# Patient Record
Sex: MALE | Race: WHITE | NOT HISPANIC OR LATINO
[De-identification: names, ages, dates, MRNs, and addresses within clinical notes are randomized per-mention and may not be internally consistent; named-entity substitution may affect disease eponyms.]

---

## 2019-03-18 ENCOUNTER — APPOINTMENT (OUTPATIENT)
Dept: VASCULAR SURGERY | Facility: CLINIC | Age: 29
End: 2019-03-18
Payer: MEDICAID

## 2019-03-18 PROCEDURE — 99203 OFFICE O/P NEW LOW 30 MIN: CPT

## 2019-03-18 NOTE — PHYSICAL EXAM
[Respiratory Effort] : normal respiratory effort [No Rash or Lesion] : No rash or lesion [Alert] : alert [Calm] : calm [2+] : right 2+ [JVD] : no jugular venous distention  [de-identified] : Well appearing. NAD [de-identified] : NCAT [de-identified] : FROM

## 2019-03-18 NOTE — HISTORY OF PRESENT ILLNESS
[FreeTextEntry1] : 29 y/o M presents today for initial evaluation of R ankle pain, cramping and occasional numbness. Patient reports that he intermittently experience ankle pressure, cramping, and numbness. He reports that this occurs while at rest, walking, performing exercises. he denies any swelling.  He reports that he fractured his right ankle a few years ago. He also reports sometimes experiencing these symptoms in his left ankle, but to a lesser extent.

## 2019-03-18 NOTE — ADDENDUM
[FreeTextEntry1] : This note was written by Jaki Patel on 03/18/2019  acting as scribe for Dr. Tucker.\par

## 2019-03-18 NOTE — END OF VISIT
[FreeTextEntry3] : All medical record entries made by the Scribe were at my, Dr. Garcia's, discretion and personally dictated by me on 03/18/2019. I have reviewed the chart and agree that the record accurately reflects my personal performance of the history, physical exam, assessment and plan. I have also personally directed, reviewed and agreed to the chart.

## 2020-06-25 ENCOUNTER — EMERGENCY (EMERGENCY)
Facility: HOSPITAL | Age: 30
LOS: 1 days | Discharge: HOME | End: 2020-06-25
Attending: EMERGENCY MEDICINE | Admitting: EMERGENCY MEDICINE
Payer: MEDICAID

## 2020-06-25 VITALS
HEART RATE: 102 BPM | TEMPERATURE: 99 F | WEIGHT: 300.05 LBS | RESPIRATION RATE: 18 BRPM | OXYGEN SATURATION: 97 % | SYSTOLIC BLOOD PRESSURE: 111 MMHG | DIASTOLIC BLOOD PRESSURE: 65 MMHG

## 2020-06-25 DIAGNOSIS — S61.411A LACERATION WITHOUT FOREIGN BODY OF RIGHT HAND, INITIAL ENCOUNTER: ICD-10-CM
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
## 2020-06-25 PROCEDURE — 12001 RPR S/N/AX/GEN/TRNK 2.5CM/<: CPT

## 2020-06-25 PROCEDURE — 99283 EMERGENCY DEPT VISIT LOW MDM: CPT | Mod: 25

## 2020-06-25 PROCEDURE — 99282 EMERGENCY DEPT VISIT SF MDM: CPT | Mod: 25

## 2020-06-25 NOTE — ED PROVIDER NOTE - CLINICAL SUMMARY MEDICAL DECISION MAKING FREE TEXT BOX
30 y/o M presenting to the ED with R thenar eminence after puncture wound while changing strings on a guitar. Approximately 1cm, bleeding. FROM of fingers, radial pulse intact, tetanus UTD. Will suture. 30 y/o M presenting to the ED with R thenar eminence after puncture wound while changing strings on a guitar. Approximately 1cm, bleeding. FROM of fingers, radial pulse intact, tetanus UTD. The wound is repaired. Patient is given acute laceration and wound care management instructions. Advised to have sutures removed in 10-14 days. Discussed with patient, although normal exam, deficit may become apparent later as wound heals. Change dressing as needed. Monitor closely for infection, use bacitracin ointment as needed. Tetanus is up to date.  Followup in ER or with PCP for suture removal. There is any evidence of infection return to ER immediately.

## 2020-06-25 NOTE — ED PROVIDER NOTE - PATIENT PORTAL LINK FT
You can access the FollowMyHealth Patient Portal offered by [facility] by registering at the following website: [email or web address]. By joining [employer]’s FollowMyHealth portal, you will also be able to view your health information using other applications (apps) compatible with our system.

## 2020-06-25 NOTE — ED ADULT TRIAGE NOTE - CHIEF COMPLAINT QUOTE
accidentally cut his right thumb with pocket knife while trying to fix his guitar today.  Unknown tetanus.  Bleeding controlled.

## 2020-06-25 NOTE — ED PROVIDER NOTE - OBJECTIVE STATEMENT
30 y/o M presenting to the ED with laceration to R hand while changing guitar strings, used wrong tool and cut himself in palm. Patient states his tetanus is UTD in the past year. No numbness, tingling, weakness, able to move all fingers. Says he attempted to put crazy glue on top of his cut but was unable to stop the bleeding.

## 2020-06-25 NOTE — ED PROVIDER NOTE - NSFOLLOWUPINSTRUCTIONS_ED_ALL_ED_FT
please place bacitracin on the wound every 12 hours    please keep area dry for 48 hours; please keep as dry as possible    please keep covered and dry    please monitor for infection (redness, worsening pain, drainage, fever)    A laceration is a cut that goes through all of the layers of the skin and into the tissue that is right under the skin. Some lacerations heal on their own. Others need to be closed with skin adhesive strips, skin glue, stitches (sutures), or staples. Proper laceration care minimizes the risk of infection and helps the laceration to heal better.  If non-absorbable stitches or staples have been placed, they must be taken out within the time frame instructed by your healthcare provider.    SEEK IMMEDIATE MEDICAL CARE IF YOU HAVE ANY OF THE FOLLOWING SYMPTOMS: swelling around the wound, worsening pain, drainage from the wound, red streaking going away from your wound, inability to move finger or toe near the laceration, or discoloration of skin near the laceration.

## 2020-06-25 NOTE — ED ADULT NURSE NOTE - CHPI ED NUR SYMPTOMS NEG
no redness/no vomiting/no drainage/no fever/no purulent drainage/no rectal pain/no blood in mucus/no chills/no bleeding at site

## 2020-06-25 NOTE — ED PROVIDER NOTE - PHYSICAL EXAMINATION
General survey: Patient is well developed and well nourished. Patient is lying in stretcher, not diaphoretic and does not appear in acute distress.    No evidence of gross deformity. Full ROM of fingers, flexion, extension, abduction and adduction. Intact thumb opposition. No TTP phalanges, metacarpals, carpals, styloid proccess, or snuffbox tenderness. No evidence of thenar or hypothenar atrophy. Capillary refill <2 sec.    Skin: right hypothenar eminance, 1cm linear laceration. well approximating. Warm dry and intact. No note of any edema, pallor, jaundice, erythema, ecchymosis, or purpura    Psych: Mood and affect appropriate General survey: Patient is well developed and well nourished. Patient is lying in stretcher, not diaphoretic and does not appear in acute distress.    No evidence of gross deformity. Full ROM of fingers, flexion, extension, abduction and adduction. Intact thumb opposition. No TTP phalanges, metacarpals, carpals, styloid proccess, or snuffbox tenderness. No evidence of thenar or hypothenar atrophy. Capillary refill <2 sec.    Skin: right hypothenar eminence, 1cm linear laceration. well approximating. Warm dry and intact. No note of any edema, pallor, jaundice, erythema, ecchymosis, or purpura    Psych: Mood and affect appropriate

## 2020-07-06 ENCOUNTER — EMERGENCY (EMERGENCY)
Facility: HOSPITAL | Age: 30
LOS: 1 days | Discharge: HOME | End: 2020-07-06
Attending: EMERGENCY MEDICINE | Admitting: EMERGENCY MEDICINE
Payer: MEDICAID

## 2020-07-06 VITALS
TEMPERATURE: 98 F | DIASTOLIC BLOOD PRESSURE: 87 MMHG | OXYGEN SATURATION: 99 % | RESPIRATION RATE: 17 BRPM | HEART RATE: 79 BPM | WEIGHT: 300.05 LBS | SYSTOLIC BLOOD PRESSURE: 154 MMHG

## 2020-07-06 PROCEDURE — L9995: CPT

## 2020-07-06 PROCEDURE — 99212 OFFICE O/P EST SF 10 MIN: CPT

## 2020-07-06 NOTE — ED ADULT NURSE NOTE - NSIMPLEMENTINTERV_GEN_ALL_ED
Implemented All Universal Safety Interventions:  [place] to call system. Call bell, personal items and telephone within reach. Instruct patient to call for assistance. Room bathroom lighting operational. Non-slip footwear when patient is off stretcher. Physically safe environment: no spills, clutter or unnecessary equipment. Stretcher in lowest position, wheels locked, appropriate side rails in place.

## 2020-07-06 NOTE — ED PROVIDER NOTE - PATIENT PORTAL LINK FT
You can access the FollowMyHealth Patient Portal offered by [facility] by registering at the following website: [email or web address]. By joining [employer]’s FollowMyHealth portal, you will also be able to view your health information using other applications (apps) compatible with our system.

## 2020-07-06 NOTE — ED PROVIDER NOTE - ATTENDING CONTRIBUTION TO CARE
I discussed the plan of care of the patient directly with the PA and examined the patient while in the Emergency Department. I agree with the HPI and PE as documented by the PA.  Pt is a 28yo m, who presents for suture removal from r hand placed 1 wk ago. No f/c, pus drainage, swelling, pain... Well [id number] wound to r thenar eminence with 2 sutures in place, c/d/i.  Sutures removed without difficulty. Wound care discussed. Pt given return precautions and is understanding of discharge plan of care. I discussed the plan of care of the patient directly with the PA while the patient was in the Emergency Department. I did not perform a face to face diagnostic evaluation on this patient. I have reviewed the ACP note and agree with the history, exam and plan of care.

## 2020-07-06 NOTE — ED PROVIDER NOTE - CLINICAL SUMMARY MEDICAL DECISION MAKING FREE TEXT BOX
29 M healthy presents for suture removal of wound repaired to R hand one week prior.  1cm lac CDI healed to R thenar eminence w/ 2 sutures intact.  sutures removed without complication.  educated on continued wound care and d/c in stable condition.

## 2020-07-06 NOTE — ED PROVIDER NOTE - PHYSICAL EXAMINATION
Vitals reviewed  Gen: well appearing, nad, speaking in full sentences  Skin: wwp, 1cm healed lac to R thenar eminence w/ two sutures intact- no extending redness/swelling, no discharge or bleeding  HEENT: ncat, eomi, mmm  CV: rrr, no audible m/r/g  Resp: unlabored breathing   Ext: FROM throughout, no peripheral edema  Neuro: alert/oriented, no focal deficits, steady gait

## 2020-07-06 NOTE — ED PROVIDER NOTE - NSFOLLOWUPINSTRUCTIONS_ED_ALL_ED_FT
Suture Removal, Care After  This sheet gives you information about how to care for yourself after your procedure. Your health care provider may also give you more specific instructions. If you have problems or questions, contact your health care provider.  What can I expect after the procedure?  After your stitches (sutures) are removed, it is common to have:  Some discomfort and swelling in the area.Slight redness in the area.Follow these instructions at home:  If you have a bandage:     Wash your hands with soap and water before you change your bandage (dressing). If soap and water are not available, use hand .Change your dressing as told by your health care provider. If your dressing becomes wet or dirty, or develops a bad smell, change it as soon as possible.If your dressing sticks to your skin, soak it in warm water to loosen it.Wound care        Check your wound every day for signs of infection. Check for:  More redness, swelling, or pain.Fluid or blood.Warmth.Pus or a bad smell.Wash your hands with soap and water before and after touching your wound.Apply cream or ointment only as directed by your health care provider. If you are using cream or ointment, wash the area with soap and water 2 times a day to remove all the cream or ointment. Rinse off the soap and pat the area dry with a clean towel.If you have skin glue or adhesive strips on your wound, leave these closures in place. They may need to stay in place for 2 weeks or longer. If adhesive strip edges start to loosen and curl up, you may trim the loose edges. Do not remove adhesive strips completely unless your health care provider tells you to do that.Keep the wound area dry and clean. Do not take baths, swim, or use a hot tub until your health care provider approves.Continue to protect the wound from injury.Do not pick at your wound. Picking can cause an infection.When your wound has completely healed, wear sunscreen over it or cover it with clothing when you are outside. New scars get sunburned easily, which can make scarring worse.General instructions     Take over-the-counter and prescription medicines only as told by your health care provider.Keep all follow-up visits as told by your health care provider. This is important.Contact a health care provider if:  You have redness, swelling, or pain around your wound.You have fluid or blood coming from your wound.Your wound feels warm to the touch.You have pus or a bad smell coming from your wound.Your wound opens up.Get help right away if:  You have a fever.You have redness that is spreading from your wound.Summary  After your sutures are removed, it is common to have some discomfort and swelling in the area.Wash your hands with soap and water before you change your bandage (dressing).Keep the wound area dry and clean. Do not take baths, swim, or use a hot tub until your health care provider approves.This information is not intended to replace advice given to you by your health care provider. Make sure you discuss any questions you have with your health care provider.

## 2020-07-09 DIAGNOSIS — S61.411D LACERATION WITHOUT FOREIGN BODY OF RIGHT HAND, SUBSEQUENT ENCOUNTER: ICD-10-CM

## 2020-07-09 DIAGNOSIS — Z48.02 ENCOUNTER FOR REMOVAL OF SUTURES: ICD-10-CM
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
## 2021-02-02 ENCOUNTER — TRANSCRIPTION ENCOUNTER (OUTPATIENT)
Age: 31
End: 2021-02-02

## 2021-02-05 ENCOUNTER — TRANSCRIPTION ENCOUNTER (OUTPATIENT)
Age: 31
End: 2021-02-05

## 2021-02-25 ENCOUNTER — TRANSCRIPTION ENCOUNTER (OUTPATIENT)
Age: 31
End: 2021-02-25

## 2021-03-01 ENCOUNTER — APPOINTMENT (OUTPATIENT)
Dept: OTOLARYNGOLOGY | Facility: CLINIC | Age: 31
End: 2021-03-01
Payer: MEDICAID

## 2021-03-01 VITALS
TEMPERATURE: 98.1 F | HEIGHT: 76 IN | WEIGHT: 295 LBS | HEART RATE: 94 BPM | BODY MASS INDEX: 35.92 KG/M2 | OXYGEN SATURATION: 95 % | SYSTOLIC BLOOD PRESSURE: 156 MMHG | DIASTOLIC BLOOD PRESSURE: 91 MMHG

## 2021-03-01 DIAGNOSIS — H93.13 TINNITUS, BILATERAL: ICD-10-CM

## 2021-03-01 PROCEDURE — 92504 EAR MICROSCOPY EXAMINATION: CPT

## 2021-03-01 PROCEDURE — 99072 ADDL SUPL MATRL&STAF TM PHE: CPT

## 2021-03-01 PROCEDURE — 99203 OFFICE O/P NEW LOW 30 MIN: CPT | Mod: 25

## 2021-03-01 PROCEDURE — 92557 COMPREHENSIVE HEARING TEST: CPT

## 2021-03-01 PROCEDURE — 92550 TYMPANOMETRY & REFLEX THRESH: CPT

## 2021-03-01 NOTE — PHYSICAL EXAM
[Binocular Microscopic Exam] : Binocular microscopic exam was performed [] : septum deviated to the right [Normal] : no rashes [FreeTextEntry1] : anxious presentation

## 2021-03-01 NOTE — HISTORY OF PRESENT ILLNESS
[de-identified] : Woke up last night w/ a red, inflamed R>L ears and a feeling like "numb but on fire" on the right. Pain level now 0/10. Some dizziness but no spinning; feels very hot at times. No hearing loss but c/o frequent bilat high pitched nonpulsatile tinnitus. Some dizziness but no vertigo. Denies: drainage, frequent loud noise exp/shooting, frequent infections, hx of ear surgery or IV antibiotics/chemo; denies a FHx of hearing loss. Qtip use: yes\par \par Covid-19 screening questions: \par   Sick contacts, recent international travel, shortness of breath, new or productive cough, fevers/chills/night sweats: no\par   COVID-19 testing: neg rapid 3 wks ago\par   Vaccination: no\par

## 2021-03-01 NOTE — ASSESSMENT
[FreeTextEntry1] : Reassured him that his hearing is ok & there are no signs of inflammation. Discussed general tinnitus treatment including masking, amplification when appropriate, notch therapy, helpful smartphone applications and OTC medications. RTC any worsening or recurrence of ear inflammation.

## 2022-01-19 ENCOUNTER — TRANSCRIPTION ENCOUNTER (OUTPATIENT)
Age: 32
End: 2022-01-19

## 2022-03-10 ENCOUNTER — EMERGENCY (EMERGENCY)
Facility: HOSPITAL | Age: 32
LOS: 1 days | Discharge: HOME | End: 2022-03-10
Attending: STUDENT IN AN ORGANIZED HEALTH CARE EDUCATION/TRAINING PROGRAM | Admitting: STUDENT IN AN ORGANIZED HEALTH CARE EDUCATION/TRAINING PROGRAM
Payer: MEDICAID

## 2022-03-10 VITALS — HEART RATE: 96 BPM | RESPIRATION RATE: 18 BRPM | OXYGEN SATURATION: 98 %

## 2022-03-10 VITALS
RESPIRATION RATE: 18 BRPM | SYSTOLIC BLOOD PRESSURE: 144 MMHG | TEMPERATURE: 99 F | OXYGEN SATURATION: 98 % | DIASTOLIC BLOOD PRESSURE: 79 MMHG | HEART RATE: 112 BPM | WEIGHT: 274.92 LBS | HEIGHT: 76 IN

## 2022-03-10 PROCEDURE — 99283 EMERGENCY DEPT VISIT LOW MDM: CPT

## 2022-03-10 PROCEDURE — 99284 EMERGENCY DEPT VISIT MOD MDM: CPT

## 2022-03-10 NOTE — ED PROVIDER NOTE - NSFOLLOWUPCLINICS_GEN_ALL_ED_FT
[facility]  Family Medicine  [address]  [place], [place] [address]  Phone: [phone]  Fax:

## 2022-03-10 NOTE — ED PROVIDER NOTE - PATIENT PORTAL LINK FT
You can access the FollowMyHealth Patient Portal offered by [facility] by registering at the following website: [email or web address]. By joining "[employer]"’s FollowMyHealth portal, you will also be able to view your health information using other applications (apps) compatible with our system.

## 2022-03-10 NOTE — ED PROVIDER NOTE - CLINICAL SUMMARY MEDICAL DECISION MAKING FREE TEXT BOX
Concern for BRBPR likely secondary to hemorrhoids given rubbery bulge on exam and perianal skin tag.  No active rectal bleeding at this time.  Tachycardia likely 2/2 to anxiety, will reassure patient and provide PO hydration.  If persistent tachycardia will r/o acute blood loss anemia, other gross metabolic abnormality and arrythmia as causes for tachycardia.

## 2022-03-10 NOTE — ED PROVIDER NOTE - PHYSICAL EXAMINATION
General: comfortable, resting in ED  HEENT: atraumatic, no eye erythema or discharge  Pulm: no cyanosis, no added work of breathing  Cardiac: extremities warm, intact peripheral pulse  GI: no abdominal distension, abdomen nontender   (with RN Lisa): perianal skin tag, nontender rubbery bulge external anal sphincter, good rectal tone, no blood in rectal vault, no active rectal bleeding, no fissures  Neuro: alert, conversant  Psych: neutral affect, cooperative  Msk: no gross deformity or instability  Skin: no erythema or rash

## 2022-03-10 NOTE — ED PROVIDER NOTE - PROGRESS NOTE DETAILS
HR improved and in normal range after recheck.  Plan to discharge home with return precautions and outpatient followup.

## 2022-03-10 NOTE — ED ADULT NURSE NOTE - OBJECTIVE STATEMENT
Pt presents to ED C/O rectal bleeding when wiping and while cleaning in the shower. Pt referred to ED from [facility] for further evaluation.  Rectal exam performed by MD [facility] with RN chaperone, pt accepting and understanding of intervention. RN continuing to monitor.

## 2022-03-10 NOTE — ED PROVIDER NOTE - OBJECTIVE STATEMENT
31M with no past medical history, now with mild intermittent rectal bleeding for the last 3 days, worse when cleaning area with hand, no gross blood on stool, no diarrhea.

## 2022-03-10 NOTE — ED PROVIDER NOTE - NS ED ROS FT
REVIEW OF SYSTEMS  positive for:   negative for: fever, headache, eye pain, runny nose, sore throat, cough, shortness of breath, chest pain, stomach pain, vomiting, diarrhea, dysuria, myalgias, rash

## 2022-03-11 ENCOUNTER — NON-APPOINTMENT (OUTPATIENT)
Age: 32
End: 2022-03-11

## 2022-03-11 ENCOUNTER — APPOINTMENT (OUTPATIENT)
Dept: INTERNAL MEDICINE | Facility: CLINIC | Age: 32
End: 2022-03-11
Payer: MEDICAID

## 2022-03-11 VITALS
SYSTOLIC BLOOD PRESSURE: 146 MMHG | DIASTOLIC BLOOD PRESSURE: 90 MMHG | OXYGEN SATURATION: 98 % | WEIGHT: 275 LBS | HEART RATE: 93 BPM | RESPIRATION RATE: 14 BRPM | BODY MASS INDEX: 33.47 KG/M2 | TEMPERATURE: 98.7 F

## 2022-03-11 DIAGNOSIS — Z00.01 ENCOUNTER FOR GENERAL ADULT MEDICAL EXAMINATION WITH ABNORMAL FINDINGS: ICD-10-CM

## 2022-03-11 DIAGNOSIS — Z13.220 ENCOUNTER FOR SCREENING FOR LIPOID DISORDERS: ICD-10-CM

## 2022-03-11 PROCEDURE — 99203 OFFICE O/P NEW LOW 30 MIN: CPT | Mod: GC,25

## 2022-03-11 PROCEDURE — 99385 PREV VISIT NEW AGE 18-39: CPT | Mod: 25

## 2022-03-14 ENCOUNTER — APPOINTMENT (OUTPATIENT)
Dept: INTERNAL MEDICINE | Facility: CLINIC | Age: 32
End: 2022-03-14

## 2022-03-14 DIAGNOSIS — F17.200 NICOTINE DEPENDENCE, UNSPECIFIED, UNCOMPLICATED: ICD-10-CM

## 2022-03-14 DIAGNOSIS — K62.5 HEMORRHAGE OF ANUS AND RECTUM: ICD-10-CM

## 2022-03-14 LAB
ALBUMIN SERPL ELPH-MCNC: 5.2 G/DL
ALP BLD-CCNC: 92 U/L
ALT SERPL-CCNC: 30 U/L
ANION GAP SERPL CALC-SCNC: 15 MMOL/L
AST SERPL-CCNC: 18 U/L
BASOPHILS # BLD AUTO: 0.04 K/UL
BASOPHILS NFR BLD AUTO: 0.5 %
BILIRUB SERPL-MCNC: 0.3 MG/DL
BUN SERPL-MCNC: 14 MG/DL
CALCIUM SERPL-MCNC: 9.7 MG/DL
CHLORIDE SERPL-SCNC: 103 MMOL/L
CHOLEST SERPL-MCNC: 207 MG/DL
CO2 SERPL-SCNC: 21 MMOL/L
CREAT SERPL-MCNC: 0.86 MG/DL
EGFR: 118 ML/MIN/1.73M2
EOSINOPHIL # BLD AUTO: 0.03 K/UL
EOSINOPHIL NFR BLD AUTO: 0.4 %
ESTIMATED AVERAGE GLUCOSE: 114 MG/DL
GLUCOSE SERPL-MCNC: 94 MG/DL
HBA1C MFR BLD HPLC: 5.6 %
HCT VFR BLD CALC: 48.6 %
HDLC SERPL-MCNC: 43 MG/DL
HGB BLD-MCNC: 15.9 G/DL
IMM GRANULOCYTES NFR BLD AUTO: 0.1 %
LDLC SERPL CALC-MCNC: 146 MG/DL
LYMPHOCYTES # BLD AUTO: 2.6 K/UL
LYMPHOCYTES NFR BLD AUTO: 34.1 %
MAN DIFF?: NORMAL
MCHC RBC-ENTMCNC: 29.8 PG
MCHC RBC-ENTMCNC: 32.7 GM/DL
MCV RBC AUTO: 91 FL
MONOCYTES # BLD AUTO: 0.48 K/UL
MONOCYTES NFR BLD AUTO: 6.3 %
NEUTROPHILS # BLD AUTO: 4.47 K/UL
NEUTROPHILS NFR BLD AUTO: 58.6 %
NONHDLC SERPL-MCNC: 164 MG/DL
PLATELET # BLD AUTO: 328 K/UL
POTASSIUM SERPL-SCNC: 4.5 MMOL/L
PROT SERPL-MCNC: 7.1 G/DL
RBC # BLD: 5.34 M/UL
RBC # FLD: 13.3 %
SODIUM SERPL-SCNC: 139 MMOL/L
TRIGL SERPL-MCNC: 91 MG/DL
TSH SERPL-ACNC: 1.72 UIU/ML
WBC # FLD AUTO: 7.63 K/UL

## 2022-03-14 NOTE — ED PROCEDURE NOTE - CPROC ED ANATOMIC LOCATION1
Pinkeye: Care Instructions  Overview     Pinkeye is redness and swelling of the eye surface and the conjunctiva (the lining of the eyelid and the covering of the white part of the eye). Pinkeye is also called conjunctivitis. Pinkeye is often caused by infection with bacteria or a virus. Dry air, allergies, smoke, and chemicals are other common causes. Pinkeye often gets better on its own in 7 to 10 days. Antibiotics only help if the pinkeye is caused by bacteria. Pinkeye caused by infection spreads easily. If an allergy or chemical is causing pinkeye, it will not go away unless you can avoid whatever is causing it. Follow-up care is a key part of your treatment and safety. Be sure to make and go to all appointments, and call your doctor if you are having problems. It's also a good idea to know your test results and keep a list of the medicines you take. How can you care for yourself at home? · Wash your hands often. Always wash them before and after you treat pinkeye or touch your eyes or face. · Use moist cotton or a clean, wet cloth to remove crust. Wipe from the inside corner of the eye to the outside. Use a clean part of the cloth for each wipe. · Put cold or warm wet cloths on your eye a few times a day if the eye hurts. · Do not wear contact lenses or eye makeup until the pinkeye is gone. Throw away any eye makeup you were using when you got pinkeye. Clean your contacts and storage case. If you wear disposable contacts, use a new pair when your eye has cleared and it is safe to wear contacts again. · If the doctor gave you antibiotic ointment or eyedrops, use them as directed. Use the medicine for as long as instructed, even if your eye starts looking better soon. Keep the bottle tip clean, and do not let it touch the eye area. · To put in eyedrops or ointment:  ? Tilt your head back, and pull your lower eyelid down with one finger. ? Drop or squirt the medicine inside the lower lid.   ? Close your eye for 30 to 60 seconds to let the drops or ointment move around. ? Do not touch the ointment or dropper tip to your eyelashes or any other surface. · Do not share towels, pillows, or washcloths while you have pinkeye. When should you call for help? Call your doctor now or seek immediate medical care if:    · You have pain in your eye, not just irritation on the surface.     · You have a change in vision or loss of vision.     · You have an increase in discharge from the eye.     · Your eye has not started to improve or begins to get worse within 48 hours after you start using antibiotics.     · Pinkeye lasts longer than 7 days. Watch closely for changes in your health, and be sure to contact your doctor if you have any problems. Where can you learn more? Go to http://www.gray.com/  Enter Y392 in the search box to learn more about \"Pinkeye: Care Instructions. \"  Current as of: July 1, 2021               Content Version: 13.2  © 2006-2022 [employer]. Care instructions adapted under license by [employer] (which disclaims liability or warranty for this information). If you have questions about a medical condition or this instruction, always ask your healthcare professional. [address] any warranty or liability for your use of this information. palmar

## 2022-04-11 ENCOUNTER — APPOINTMENT (OUTPATIENT)
Dept: INTERNAL MEDICINE | Facility: CLINIC | Age: 32
End: 2022-04-11

## 2022-05-06 ENCOUNTER — APPOINTMENT (OUTPATIENT)
Dept: INTERNAL MEDICINE | Facility: CLINIC | Age: 32
End: 2022-05-06
Payer: MEDICAID

## 2022-05-06 VITALS
OXYGEN SATURATION: 99 % | HEART RATE: 105 BPM | TEMPERATURE: 97.8 F | HEIGHT: 76 IN | BODY MASS INDEX: 33.97 KG/M2 | WEIGHT: 279 LBS | SYSTOLIC BLOOD PRESSURE: 141 MMHG | DIASTOLIC BLOOD PRESSURE: 94 MMHG

## 2022-05-06 PROCEDURE — 99214 OFFICE O/P EST MOD 30 MIN: CPT | Mod: GC

## 2022-07-20 ENCOUNTER — APPOINTMENT (OUTPATIENT)
Dept: INTERNAL MEDICINE | Facility: CLINIC | Age: 32
End: 2022-07-20

## 2022-07-20 VITALS
BODY MASS INDEX: 33.47 KG/M2 | OXYGEN SATURATION: 97 % | WEIGHT: 275 LBS | SYSTOLIC BLOOD PRESSURE: 131 MMHG | TEMPERATURE: 99.2 F | DIASTOLIC BLOOD PRESSURE: 96 MMHG | HEART RATE: 98 BPM

## 2022-07-20 DIAGNOSIS — R14.0 ABDOMINAL DISTENSION (GASEOUS): ICD-10-CM

## 2022-07-20 DIAGNOSIS — R10.9 ABDOMINAL DISTENSION (GASEOUS): ICD-10-CM

## 2022-07-20 PROCEDURE — 99213 OFFICE O/P EST LOW 20 MIN: CPT | Mod: GC

## 2022-07-20 RX ORDER — SENNOSIDES 8.6 MG TABLETS 8.6 MG/1
8.6 TABLET ORAL
Qty: 30 | Refills: 0 | Status: DISCONTINUED | COMMUNITY
Start: 2022-03-11 | End: 2022-07-20

## 2022-07-20 RX ORDER — OLANZAPINE 15 MG/1
15 TABLET, FILM COATED ORAL
Refills: 0 | Status: ACTIVE | COMMUNITY
Start: 2022-07-20

## 2022-07-20 RX ORDER — POLYETHYLENE GLYCOL 3350 17 G/17G
17 POWDER, FOR SOLUTION ORAL DAILY
Qty: 30 | Refills: 0 | Status: DISCONTINUED | COMMUNITY
Start: 2022-03-11 | End: 2022-07-20

## 2022-08-24 ENCOUNTER — EMERGENCY (EMERGENCY)
Facility: HOSPITAL | Age: 32
LOS: 1 days | Discharge: HOME | End: 2022-08-24
Attending: EMERGENCY MEDICINE | Admitting: EMERGENCY MEDICINE
Payer: COMMERCIAL

## 2022-08-24 VITALS
WEIGHT: 274.92 LBS | DIASTOLIC BLOOD PRESSURE: 74 MMHG | RESPIRATION RATE: 18 BRPM | SYSTOLIC BLOOD PRESSURE: 135 MMHG | TEMPERATURE: 99 F | HEIGHT: 76 IN | HEART RATE: 122 BPM | OXYGEN SATURATION: 99 %

## 2022-08-24 VITALS — TEMPERATURE: 100 F | HEART RATE: 115 BPM

## 2022-08-24 PROCEDURE — 93005 ELECTROCARDIOGRAM TRACING: CPT

## 2022-08-24 PROCEDURE — 99284 EMERGENCY DEPT VISIT MOD MDM: CPT

## 2022-08-24 PROCEDURE — 99283 EMERGENCY DEPT VISIT LOW MDM: CPT

## 2022-08-24 RX ORDER — AZTREONAM 2 G
1 VIAL (EA) INJECTION
Qty: 14 | Refills: 0
Start: 2022-08-24 | End: 2022-08-30

## 2022-08-24 NOTE — ED PROVIDER NOTE - OBJECTIVE STATEMENT
here with swelling/discomfort of left great toe for past few days. Thinks he may have ingrown nail/ cut nail too short or stubbed it. Says he saw some purulent drainage. No fever, chills.

## 2022-08-24 NOTE — ED PROVIDER NOTE - PATIENT PORTAL LINK FT
You can access the FollowMyHealth Patient Portal offered by [facility] by registering at the following website: [email or web address]. By joining [employer]’s FollowMyHealth portal, you will also be able to view your health information using other applications (apps) compatible with our system.

## 2022-08-24 NOTE — ED PROVIDER NOTE - CARE PROVIDER_API CALL
Raudel Carmen (DPM)  Podiatric Medicine and Surgery  [address]  [place], [place] [address]  Phone: [phone]  Fax: [phone]  Follow Up Time:

## 2022-08-24 NOTE — ED PROVIDER NOTE - CLINICAL SUMMARY MEDICAL DECISION MAKING FREE TEXT BOX
left toenail mild paronychia/ ingrown nail. nail cut very short. plan warm soak/bacitracin/bactrim/ podiatry referral

## 2022-08-24 NOTE — ED PROVIDER NOTE - MUSCULOSKELETAL, MLM
left grat toe mild swelling medial nailbed. no fluctuance. no purulent drainage expressible. toenail cut very short.

## 2022-08-24 NOTE — ED PROVIDER NOTE - NSFOLLOWUPINSTRUCTIONS_ED_ALL_ED_FT
Please see referral to podiatrist.  Call for appointment.  If you have any problems with followup, please call the ED Referral Coordinator at [phone].  Return to the ER if symptoms worsen or other concerns.    Soak toe in warm water several times per day and apply antibiotic ointment after soaking.       Paronychia      Paronychia is an infection of the skin that surrounds a nail. It usually affects the skin around a fingernail, but it may also occur near a toenail. It often causes pain and swelling around the nail. In some cases, a collection of pus (abscess) can form near or under the nail.     This condition may develop suddenly, or it may develop gradually over a longer period. In most cases, paronychia is not serious, and it will clear up with treatment.      What are the causes?    This condition may be caused by bacteria or a fungus, such as yeast. The bacteria or fungus can enter the body through an opening in the skin, such as a cut or a hangnail, and cause an infection in your fingernail or toenail. Other causes may include:  •Recurrent injury to the fingernail or toenail area.      •Irritation of the base and sides of the nail (cuticle).      Injury and irritation can result in inflammation, swelling, and thickened skin around the nail.      What increases the risk?    This condition is more likely to develop in people who:  •Get their hands wet often, such as those who work as dishwashers, , or housekeepers.      •Bite their fingernails or cuticles.      •Have underlying skin conditions.      •Have hangnails or injured fingertips.      •Are exposed to irritants like detergents and other chemicals.      •Have diabetes.        What are the signs or symptoms?    Symptoms of this condition include:  •Redness and swelling of the skin near the nail.      •Tenderness around the nail when you touch the area.      •Pus-filled bumps under the cuticle.      •Fluid or pus under the nail.      •Throbbing pain in the area.        How is this diagnosed?    This condition is diagnosed with a physical exam. In some cases, a sample of pus may be tested to determine what type of bacteria or fungus is causing the condition.      How is this treated?    Treatment depends on the cause and severity of your condition. If your condition is mild, it may clear up on its own in a few days or after soaking in warm water. If needed, treatment may include:  •Antibiotic medicine, if your infection is caused by bacteria.      •Antifungal medicine, if your infection is caused by a fungus.      •A procedure to drain pus from an abscess.      •Anti-inflammatory medicine (corticosteroids).      •Removal of part of an ingrown toenail.      A bandage (dressing) may be placed over the affected area if an abscess or part of a nail has been removed.      Follow these instructions at home:    Wound care     •Keep the affected area clean.      •Soak the affected area in warm water if told to do so by your health care provider. You may be told to do this for 20 minutes, 2–3 times a day.      •Keep the area dry when you are not soaking it.      • Do not try to drain an abscess yourself.    •Follow instructions from your health care provider about how to take care of the affected area. Make sure you:  •Wash your hands with soap and water for at least 20 seconds before and after you change your dressing. If soap and water are not available, use hand .      •Change your dressing as told by your health care provider.      •If you had an abscess drained, check the area every day for signs of infection. Check for:  •Redness, swelling, or pain.      •Fluid or blood.      •Warmth.      •Pus or a bad smell.          Medicines   A prescription pill bottle with an example of a pill.   •Take over-the-counter and prescription medicines only as told by your health care provider.      •If you were prescribed an antibiotic medicine, take it as told by your health care provider. Do not stop taking the antibiotic even if you start to feel better.      General instructions     •Avoid contact with any skin irritants or allergens.      • Do not pick at the affected area.      •Keep all follow-up visits as told. This is important.      Prevention     To prevent this condition from happening again:  •Wear rubber gloves when washing dishes or doing other tasks that require your hands to get wet.      •Wear gloves if your hands might come in contact with  or other chemicals.      •Avoid injuring your nails or fingertips.      •Do not bite your nails or tear hangnails.      •Do not cut your nails very short.      •Do not cut your cuticles.      •Use clean nail clippers or scissors when trimming nails.        Contact a health care provider if:    •Your symptoms get worse or do not improve with treatment.      •You have continued or increased fluid, blood, or pus coming from the affected area.      •Your affected finger, toe, or joint becomes swollen or difficult to move.      •You have a fever or chills.      •There is redness spreading away from the affected area.        Summary    •Paronychia is an infection of the skin that surrounds a nail. It often causes pain and swelling around the nail. In some cases, a collection of pus (abscess) can form near or under the nail.      •This condition may be caused by bacteria or a fungus. These germs can enter the body through an opening in the skin, such as a cut or a hangnail.      •If your condition is mild, it may clear up on its own in a few days. If needed, treatment may include medicine or a procedure to drain pus from an abscess.      •To prevent this condition from happening again, wear gloves if doing tasks that require your hands to get wet or to come in contact with chemicals. Also avoid injuring your nails or fingertips.      This information is not intended to replace advice given to you by your health care provider. Make sure you discuss any questions you have with your health care provider.

## 2022-08-26 DIAGNOSIS — L03.032 CELLULITIS OF LEFT TOE: ICD-10-CM

## 2022-08-26 DIAGNOSIS — M79.89 OTHER SPECIFIED SOFT TISSUE DISORDERS: ICD-10-CM

## 2022-08-26 DIAGNOSIS — M79.675 PAIN IN LEFT TOE(S): ICD-10-CM

## 2022-11-12 ENCOUNTER — EMERGENCY (EMERGENCY)
Facility: HOSPITAL | Age: 32
LOS: 1 days | Discharge: HOME | End: 2022-11-12
Attending: EMERGENCY MEDICINE | Admitting: EMERGENCY MEDICINE
Payer: COMMERCIAL

## 2022-11-12 VITALS
WEIGHT: 179.9 LBS | TEMPERATURE: 99 F | HEART RATE: 97 BPM | SYSTOLIC BLOOD PRESSURE: 141 MMHG | DIASTOLIC BLOOD PRESSURE: 88 MMHG | RESPIRATION RATE: 18 BRPM | OXYGEN SATURATION: 99 %

## 2022-11-12 PROCEDURE — 99283 EMERGENCY DEPT VISIT LOW MDM: CPT | Mod: 25

## 2022-11-12 PROCEDURE — 99283 EMERGENCY DEPT VISIT LOW MDM: CPT

## 2022-11-12 PROCEDURE — 73610 X-RAY EXAM OF ANKLE: CPT | Mod: 26,RT

## 2022-11-12 PROCEDURE — 73610 X-RAY EXAM OF ANKLE: CPT

## 2022-11-12 RX ORDER — IBUPROFEN 200 MG
800 TABLET ORAL ONCE
Refills: 0 | Status: COMPLETED | OUTPATIENT
Start: 2022-11-12 | End: 2022-11-12

## 2022-11-12 RX ADMIN — Medication 800 MILLIGRAM(S): at 20:09

## 2022-11-12 NOTE — ED ADULT TRIAGE NOTE - CHIEF COMPLAINT QUOTE
Patient c/o acute on chronic right ankle pain x today. Patient states he has a hx of right ankle hairline fracture sustained 6 years ago.  Denies recent trauma / falls

## 2022-11-12 NOTE — ED PROVIDER NOTE - NSICDXPASTMEDICALHX_GEN_ALL_CORE_FT
PAST MEDICAL HISTORY:  No pertinent past medical history       Chronic ankle pain     Stress fracture of ankle

## 2022-11-12 NOTE — ED PROVIDER NOTE - PATIENT PORTAL LINK FT
You can access the FollowMyHealth Patient Portal offered by [facility] by registering at the following website: [email or web address]. By joining [employer]’s FollowMyHealth portal, you will also be able to view your health information using other applications (apps) compatible with our system.

## 2022-11-12 NOTE — ED PROVIDER NOTE - PHYSICAL EXAMINATION
VITAL SIGNS: I have reviewed nursing notes and confirm.  CONSTITUTIONAL: Well-developed; well-nourished; in no acute distress.  SKIN: Agree with RN documentation regarding decubitus evaluation. Remainder of skin exam is warm and dry, no acute rash.  HEAD: Normocephalic; atraumatic.  EYES: PERRL, EOM intact; conjunctiva and sclera clear.  ENT: No nasal discharge; airway clear.  NECK: Supple; non tender.  ABD: Normal bowel sounds; soft; non-distended; non-tender; no hepatosplenomegaly.  EXT: Tender to deplantation of ankle, no foot tenderness, full ROM.  Normal ROM. No clubbing, cyanosis or edema.  LYMPH: No acute cervical adenopathy.  NEURO: Alert, oriented. Grossly unremarkable.  PSYCH: Cooperative, appropriate. VITAL SIGNS: I have reviewed nursing notes and confirm.  CONSTITUTIONAL: Well-developed; well-nourished; in no acute distress.  SKIN: Agree with RN documentation regarding decubitus evaluation. Remainder of skin exam is warm and dry, no acute rash.  HEAD: Normocephalic; atraumatic.  EYES: PERRL, EOM intact; conjunctiva and sclera clear.  ENT: No nasal discharge; airway clear.  NECK: Supple; non tender.  ABD: Normal bowel sounds; soft; non-distended; non-tender; no hepatosplenomegaly.  EXT: Tender to deplantation of anterior ankle, no foot tenderness, full ROM.  Normal ROM. No clubbing, cyanosis or edema. + nl sensation and motor and nl distal pulses   LYMPH: No acute cervical adenopathy.  NEURO: Alert, oriented. Grossly unremarkable.  PSYCH: Cooperative, appropriate.

## 2022-11-12 NOTE — ED ADULT NURSE NOTE - OBJECTIVE STATEMENT
Patient c/o acute on chronic right ankle pain x today. Patient states he has a hx of right ankle hairline fracture sustained 6 years ago.  Denies recent trauma / falls. Taken to CT

## 2022-11-12 NOTE — ED PROVIDER NOTE - OBJECTIVE STATEMENT
31 y/o M with a PMHx of stress fracture on ankle as well as spiral fracture in leg (fibula or tibia?) and no surgeries. Pt state fractures are from 5 years ago and has had chronic ankle pain since then and is now presenting to the ED c/o worsening pain to his right ankle. Pt states his right ankle was selling at night. Pt hasn't seen his orthopedist in a long time. Pt denies any numbness.

## 2022-11-12 NOTE — ED PROVIDER NOTE - NSFOLLOWUPINSTRUCTIONS_ED_ALL_ED_FT
There are no apparent new fractures or dislocations on your xray. You may need an MRI of your ankle as an outpatient to further evaluation. Follow up with either orthopedics or with podiatry.     Take Ibuprophen 800 mg ( 4 , 200 mg tablets) up to 4 times a day for one week, ice and elevate your foot / ankle.

## 2022-11-12 NOTE — ED PROVIDER NOTE - CLINICAL SUMMARY MEDICAL DECISION MAKING FREE TEXT BOX
31 y/o M with worsening chronic ankle pain, high suspicion for arthritis discomfort. Will XR for bony abnormalities. Advised ice, ibuprofen, rest, and elevation. Will wrap ankle with ace bandage.

## 2022-11-15 DIAGNOSIS — G89.29 OTHER CHRONIC PAIN: ICD-10-CM

## 2022-11-15 DIAGNOSIS — M25.571 PAIN IN RIGHT ANKLE AND JOINTS OF RIGHT FOOT: ICD-10-CM

## 2022-12-06 NOTE — ED ADULT TRIAGE NOTE - NSWEIGHTCALCTOOLDRUG_GEN_A_CORE
Interventional Radiology Post-Procedure Note    Procedure: Tunneled dialysis catheter (TDC) placement. Left thoracentesis    Attending: Luis Workman MD    Findings: Ultrasound and fluoroscopic placement of a tunneled dialysis catheter.    Small left pleural effusion. 200 mL fluid aspirated    Plan: The tunneled dialysis catheter is ready for use.    used

## 2022-12-18 ENCOUNTER — NON-APPOINTMENT (OUTPATIENT)
Age: 32
End: 2022-12-18

## 2023-01-05 ENCOUNTER — NON-APPOINTMENT (OUTPATIENT)
Age: 33
End: 2023-01-05

## 2023-01-07 PROBLEM — M84.373A: Chronic | Status: ACTIVE | Noted: 2022-11-12

## 2023-01-07 PROBLEM — M25.579 PAIN IN UNSPECIFIED ANKLE AND JOINTS OF UNSPECIFIED FOOT: Chronic | Status: ACTIVE | Noted: 2022-11-12

## 2023-01-17 ENCOUNTER — NON-APPOINTMENT (OUTPATIENT)
Age: 33
End: 2023-01-17

## 2023-01-17 ENCOUNTER — APPOINTMENT (OUTPATIENT)
Dept: ORTHOPEDIC SURGERY | Facility: CLINIC | Age: 33
End: 2023-01-17
Payer: MEDICAID

## 2023-01-17 DIAGNOSIS — M70.61 TROCHANTERIC BURSITIS, RIGHT HIP: ICD-10-CM

## 2023-01-17 DIAGNOSIS — M76.891 OTHER SPECIFIED ENTHESOPATHIES OF RIGHT LOWER LIMB, EXCLUDING FOOT: ICD-10-CM

## 2023-01-17 PROCEDURE — 99203 OFFICE O/P NEW LOW 30 MIN: CPT

## 2023-01-18 NOTE — HISTORY OF PRESENT ILLNESS
[de-identified] : Location:  Right hip pain ( lateral proximal )\par Duration: started 1-2 month ago \par Context: atraumatic  \par Quality:feels like a  tearing in the right hip \par Aggravating factors: over using \par Pain level: 2/10 \par intermittent \par Conservative treatment:  meloxicam, rest \par prior studies: n.a \par \par \par

## 2023-01-18 NOTE — PHYSICAL EXAM
[de-identified] : Right hip\par \par Constitutional: \par The patient is healthy-appearing and in no apparent distress. \par \par Gait:\par The patient ambulates with a normal gait and no limp.\par \par Cardiovascular System: \par There is capillary refill less than 2 seconds. \par \par Skin: \par There is no skin abnormalities.\par \par Lumbar Spine;\par There is no significance malalignment and no tenderness to the paraspinal musculature.\par \par Right hip:\par \par Bony Palpation: \par There is no tenderness of the iliac crest.\par There is no tenderness of the ASIS.\par There is no tenderness of the PSIS.\par There is no tenderness of the SI joint.\par There is tenderness of the greater trochanter. \par \par Soft Tissue Palpation: \par There is no tenderness of the hip adductors.\par There is no tenderness of the hip abductors.\par There is no tenderness of the hamstrings. \par There is no tenderness of the piriformis. \par There is no tenderness of the hip flexors.\par \par Active Range of Motion: \par There is full range of motion at the hip actively and passively. \par There is no groin pain with hip logroll in seated and supine positioning.\par \par Special Tests: \par There is a positive Abby's test.\par There is a negative Clay-Shen test. \par \par Strength: \par There is 5/5 hip flexion, adduction, abduction.  \par \par Psychiatric: \par The patient demonstrates a normal mood and affect and is active and alert. [de-identified] : Given patient's reported history and physical examination, x-ray evaluation ( as listed below ) was ordered and performed to aid in diagnosis and treatment of the patient.\par X-ray right hip.  There is no significant bony / soft tissue abnormality, arthritis, or fracture.

## 2023-01-18 NOTE — ASSESSMENT
[FreeTextEntry1] : Discussed at length with patient exam history and imaging and symptoms consistent with a tight IT band and abductor tendinosis.  Treatment options reviewed and patient this time elects home exercises and observation

## 2023-01-30 NOTE — ED PROVIDER NOTE - NSICDXPASTSURGICALHX_GEN_ALL_CORE_FT
PAST SURGICAL HISTORY:  No significant past surgical history     
Skilled Nursing Facility Notes/Prior Outpatient Labs

## 2023-02-21 ENCOUNTER — APPOINTMENT (OUTPATIENT)
Dept: INTERNAL MEDICINE | Facility: CLINIC | Age: 33
End: 2023-02-21
Payer: MEDICAID

## 2023-02-21 VITALS
SYSTOLIC BLOOD PRESSURE: 144 MMHG | HEIGHT: 64 IN | HEART RATE: 110 BPM | RESPIRATION RATE: 18 BRPM | OXYGEN SATURATION: 98 % | DIASTOLIC BLOOD PRESSURE: 88 MMHG | BODY MASS INDEX: 48.65 KG/M2 | WEIGHT: 285 LBS | TEMPERATURE: 97.8 F

## 2023-02-21 DIAGNOSIS — Z79.899 OTHER LONG TERM (CURRENT) DRUG THERAPY: ICD-10-CM

## 2023-02-21 DIAGNOSIS — Z87.891 PERSONAL HISTORY OF NICOTINE DEPENDENCE: ICD-10-CM

## 2023-02-21 DIAGNOSIS — E78.5 HYPERLIPIDEMIA, UNSPECIFIED: ICD-10-CM

## 2023-02-21 PROCEDURE — G0447 BEHAVIOR COUNSEL OBESITY 15M: CPT

## 2023-02-21 PROCEDURE — 99406 BEHAV CHNG SMOKING 3-10 MIN: CPT

## 2023-02-21 PROCEDURE — 99395 PREV VISIT EST AGE 18-39: CPT | Mod: GC,25

## 2023-02-21 NOTE — ED ADULT NURSE NOTE - NS ED NURSE LEVEL OF CONSCIOUSNESS ORIENTATION
Detail Level: Generalized
Detail Level: Simple
Sunscreen Recommendations: Broad spectrum sunscreen of 30+
Oriented - self; Oriented - place; Oriented - time

## 2023-02-23 LAB
ALBUMIN SERPL ELPH-MCNC: 5 G/DL
ALP BLD-CCNC: 78 U/L
ALT SERPL-CCNC: 26 U/L
ANION GAP SERPL CALC-SCNC: 13 MMOL/L
AST SERPL-CCNC: 22 U/L
BILIRUB SERPL-MCNC: 0.3 MG/DL
BUN SERPL-MCNC: 13 MG/DL
CALCIUM SERPL-MCNC: 9.8 MG/DL
CHLORIDE SERPL-SCNC: 105 MMOL/L
CHOLEST SERPL-MCNC: 176 MG/DL
CO2 SERPL-SCNC: 24 MMOL/L
CREAT SERPL-MCNC: 0.96 MG/DL
EGFR: 107 ML/MIN/1.73M2
GLUCOSE SERPL-MCNC: 86 MG/DL
HDLC SERPL-MCNC: 40 MG/DL
LDLC SERPL CALC-MCNC: 117 MG/DL
NONHDLC SERPL-MCNC: 136 MG/DL
POTASSIUM SERPL-SCNC: 4.6 MMOL/L
PROT SERPL-MCNC: 6.8 G/DL
SODIUM SERPL-SCNC: 142 MMOL/L
TRIGL SERPL-MCNC: 95 MG/DL
TSH SERPL-ACNC: 1.34 UIU/ML

## 2023-05-15 ENCOUNTER — APPOINTMENT (OUTPATIENT)
Dept: INTERNAL MEDICINE | Facility: CLINIC | Age: 33
End: 2023-05-15
Payer: MEDICAID

## 2023-05-15 VITALS
TEMPERATURE: 97.3 F | HEART RATE: 99 BPM | OXYGEN SATURATION: 99 % | SYSTOLIC BLOOD PRESSURE: 155 MMHG | RESPIRATION RATE: 15 BRPM | DIASTOLIC BLOOD PRESSURE: 90 MMHG

## 2023-05-15 DIAGNOSIS — Z00.00 ENCOUNTER FOR GENERAL ADULT MEDICAL EXAMINATION W/OUT ABNORMAL FINDINGS: ICD-10-CM

## 2023-05-15 DIAGNOSIS — Z01.00 ENCOUNTER FOR EXAMINATION OF EYES AND VISION W/OUT ABNORMAL FINDINGS: ICD-10-CM

## 2023-05-15 PROCEDURE — 99214 OFFICE O/P EST MOD 30 MIN: CPT | Mod: GC

## 2023-05-15 NOTE — HISTORY OF PRESENT ILLNESS
[FreeTextEntry1] : needs vision exam for 's license renewal w/ DMV [de-identified] : 31 y/o M with history of mood disorder and HTN presenting for vision exam for 's license renewal. No other active complaints. Pt does not wear glasses or contact lenses and has never had vision issues.

## 2023-05-15 NOTE — PLAN
[FreeTextEntry1] : #HTN\par Pt w/ hx of HTN previously not receptive to treatment. Reported to previously been taking an anti HTN medication but discontinued due to facial flushing (does not remember which medication). Now open to treatment.\par - start HCTZ 12.5mg QD\par - RTC 4 weeks for BMP - renal function assessment\par \par #HCM\par Vision 20/20 (-1) b/l as examined by Snellen chart. Pt able to read small print w/ R+L eye. DMV requirement vision > 20/40. \par - instructed how to fill DMV form for license renewal\par \par RTC 4 weeks for HTN f/u on HCTZ and BMP for renal function assessment

## 2023-05-15 NOTE — REVIEW OF SYSTEMS
[Fever] : no fever [Chills] : no chills [Vision Problems] : no vision problems [Chest Pain] : no chest pain [Palpitations] : no palpitations [Shortness Of Breath] : no shortness of breath [Cough] : no cough [Abdominal Pain] : no abdominal pain [Nausea] : no nausea [Vomiting] : no vomiting [Dysuria] : no dysuria [Skin Rash] : no skin rash [Headache] : no headache [Dizziness] : no dizziness

## 2023-05-15 NOTE — ASSESSMENT
[FreeTextEntry1] : 33 y/o M with history of mood disorder and HTN presenting for vision exam for 's license renewal.

## 2023-05-15 NOTE — END OF VISIT
[] : Resident [FreeTextEntry3] : Here for eye exam for DMV \par 20/25 both eyes \par HTN- noted to be elevated today, receptive to medication today, states he had something before which caused his ears and face to get red, will start HCTZ 12.5mg return to clinic in 4 weeks for repeat BP check and BMP check\par Optho referral for eye check, kiran in setting of HTN, pt amenable

## 2023-05-15 NOTE — PHYSICAL EXAM
[No Acute Distress] : no acute distress [Normal Sclera/Conjunctiva] : normal sclera/conjunctiva [PERRL] : pupils equal round and reactive to light [EOMI] : extraocular movements intact [Normal] : Visual acuity was normal [Snellen] : acuity screening with Snellen chart [No Respiratory Distress] : no respiratory distress  [No Accessory Muscle Use] : no accessory muscle use [Clear to Auscultation] : lungs were clear to auscultation bilaterally [Normal Rate] : normal rate  [Regular Rhythm] : with a regular rhythm [Normal S1, S2] : normal S1 and S2 [No Murmur] : no murmur heard [No Edema] : there was no peripheral edema [Soft] : abdomen soft [Non Tender] : non-tender [Non-distended] : non-distended [No Masses] : no abdominal mass palpated [No HSM] : no HSM [Normal Bowel Sounds] : normal bowel sounds [No Rash] : no rash [No Focal Deficits] : no focal deficits [20/___] : both eyes 20/[unfilled]

## 2023-06-12 ENCOUNTER — RX RENEWAL (OUTPATIENT)
Age: 33
End: 2023-06-12

## 2023-06-12 ENCOUNTER — APPOINTMENT (OUTPATIENT)
Dept: INTERNAL MEDICINE | Facility: CLINIC | Age: 33
End: 2023-06-12
Payer: MEDICAID

## 2023-06-12 VITALS
DIASTOLIC BLOOD PRESSURE: 79 MMHG | WEIGHT: 285 LBS | TEMPERATURE: 97 F | OXYGEN SATURATION: 98 % | HEIGHT: 64 IN | BODY MASS INDEX: 48.65 KG/M2 | HEART RATE: 96 BPM | SYSTOLIC BLOOD PRESSURE: 141 MMHG

## 2023-06-12 DIAGNOSIS — E66.01 MORBID (SEVERE) OBESITY DUE TO EXCESS CALORIES: ICD-10-CM

## 2023-06-12 PROCEDURE — 99214 OFFICE O/P EST MOD 30 MIN: CPT | Mod: GC

## 2023-07-10 ENCOUNTER — APPOINTMENT (OUTPATIENT)
Dept: INTERNAL MEDICINE | Facility: CLINIC | Age: 33
End: 2023-07-10
Payer: MEDICAID

## 2023-07-10 VITALS
BODY MASS INDEX: 34.1 KG/M2 | HEART RATE: 100 BPM | HEIGHT: 76 IN | TEMPERATURE: 97.1 F | WEIGHT: 280 LBS | SYSTOLIC BLOOD PRESSURE: 135 MMHG | DIASTOLIC BLOOD PRESSURE: 83 MMHG | OXYGEN SATURATION: 98 %

## 2023-07-10 VITALS — HEIGHT: 76 IN | BODY MASS INDEX: 34.08 KG/M2

## 2023-07-10 DIAGNOSIS — Z23 ENCOUNTER FOR IMMUNIZATION: ICD-10-CM

## 2023-07-10 PROCEDURE — 90715 TDAP VACCINE 7 YRS/> IM: CPT

## 2023-07-10 PROCEDURE — 90471 IMMUNIZATION ADMIN: CPT

## 2023-07-10 PROCEDURE — 99214 OFFICE O/P EST MOD 30 MIN: CPT | Mod: GC,25

## 2023-07-10 NOTE — ASSESSMENT
[FreeTextEntry1] : 32M w/ PMHx of HTN, mood disorder, daily smoker (1PPD) presents to [facility] for BP f/u. \par \par #Hypertension\par On HCTZ 25mg, recently increased on last visit. Has not checked BP at home as cuff size was inappropriate so never bought device. BP today, 135/85. Patient has been compliant with medications. Likely component of obesity involved.\par - Advised obtaining correct BP cuff\par - C/w HCTZ 25mg as BP has improved, however will need further optimization\par - If home BP still elevated, can consider adding additional agent\par - Weight loss recommendation as below\par \par #Morbid Obesity\par BMI 34. Patient is on Olanzapine for Schizophrenia, which may be exacerbating weight. \par - Start Metformin 500mg qd for weight loss\par - Advised patient to discuss with Psych regarding switching Olanzapine to an alternative agent\par \par #Schizophrenia\par Patient on Olanzapine 15mg, compliant. Follows outpatient at [facility], last visit reportedly 2 weeks ago. Denies auditory or visual hallucinations currently. \par - Follow outpatient\par - As above regarding Olanzapine  \par \par #HCM\par - Tdap today\par RTC in 3 months\par

## 2023-07-10 NOTE — END OF VISIT
[] : Resident [FreeTextEntry3] : \par \par 33 yo m w/ h/o htn, schizophrneia, obesity, tobacco use here for htn\par \par #HTN- bp better but still high- can increase hctz and check labs in a few weeks. should monitor bp at home- cont hctz 25mg as bp better. work on weight loss. \par \par #tobacco use- try to stop- 1 ppd\par \par #obesity- could be related to olanzapine too. work on diet and exercise more. consider metformin for weight loss from antipsychotic posisbly\par \par

## 2023-07-10 NOTE — HISTORY OF PRESENT ILLNESS
[FreeTextEntry1] : BP check [de-identified] : 32M w/ PMHx of HTN, mood disorder, daily smoker (1PPD) presents to [facility] for BP f/u. Patient has been compliant and tolerated medications well. He last took HCTZ 25mg last night. Patient is still actively smoking, smoke earlier today. Patient was not able to obtain a BP cuff due to inappropriate cuff size. Patient has otherwise stayed in his normal state of health. Follows regularly with [facility]. ROS negative.

## 2023-07-10 NOTE — PHYSICAL EXAM
[No Acute Distress] : no acute distress [Well Nourished] : well nourished [Well Developed] : well developed [Well-Appearing] : well-appearing [Normal Sclera/Conjunctiva] : normal sclera/conjunctiva [PERRL] : pupils equal round and reactive to light [EOMI] : extraocular movements intact [Normal Outer Ear/Nose] : the outer ears and nose were normal in appearance [Normal Oropharynx] : the oropharynx was normal [No JVD] : no jugular venous distention [No Lymphadenopathy] : no lymphadenopathy [Supple] : supple [No Respiratory Distress] : no respiratory distress  [Thyroid Normal, No Nodules] : the thyroid was normal and there were no nodules present [No Accessory Muscle Use] : no accessory muscle use [Clear to Auscultation] : lungs were clear to auscultation bilaterally [Normal Rate] : normal rate  [Regular Rhythm] : with a regular rhythm [Normal S1, S2] : normal S1 and S2 [No Murmur] : no murmur heard [No Carotid Bruits] : no carotid bruits [No Abdominal Bruit] : a ~M bruit was not heard ~T in the abdomen [No Varicosities] : no varicosities [Pedal Pulses Present] : the pedal pulses are present [No Edema] : there was no peripheral edema [No Palpable Aorta] : no palpable aorta [No Extremity Clubbing/Cyanosis] : no extremity clubbing/cyanosis [Soft] : abdomen soft [Non Tender] : non-tender [Non-distended] : non-distended [No Masses] : no abdominal mass palpated [No HSM] : no HSM [Normal Bowel Sounds] : normal bowel sounds [Normal Posterior Cervical Nodes] : no posterior cervical lymphadenopathy [Normal Anterior Cervical Nodes] : no anterior cervical lymphadenopathy [No CVA Tenderness] : no CVA  tenderness [No Spinal Tenderness] : no spinal tenderness [No Joint Swelling] : no joint swelling [Grossly Normal Strength/Tone] : grossly normal strength/tone [No Rash] : no rash [Coordination Grossly Intact] : coordination grossly intact [No Focal Deficits] : no focal deficits [Normal Gait] : normal gait [Deep Tendon Reflexes (DTR)] : deep tendon reflexes were 2+ and symmetric [Normal Affect] : the affect was normal [Normal Insight/Judgement] : insight and judgment were intact [de-identified] : +obese body habitus

## 2023-07-10 NOTE — HEALTH RISK ASSESSMENT
[0] : 2) Feeling down, depressed, or hopeless: Not at all (0) [PHQ-2 Negative - No further assessment needed] : PHQ-2 Negative - No further assessment needed [[id number]] : 0

## 2023-07-12 LAB
ALBUMIN SERPL ELPH-MCNC: 4.6 G/DL
ALP BLD-CCNC: 75 U/L
ALT SERPL-CCNC: 17 U/L
ANION GAP SERPL CALC-SCNC: 13 MMOL/L
AST SERPL-CCNC: 17 U/L
BILIRUB SERPL-MCNC: 0.2 MG/DL
BUN SERPL-MCNC: 14 MG/DL
CALCIUM SERPL-MCNC: 9.4 MG/DL
CHLORIDE SERPL-SCNC: 101 MMOL/L
CO2 SERPL-SCNC: 25 MMOL/L
CREAT SERPL-MCNC: 0.91 MG/DL
EGFR: 114 ML/MIN/1.73M2
GLUCOSE SERPL-MCNC: 89 MG/DL
POTASSIUM SERPL-SCNC: 3.3 MMOL/L
PROT SERPL-MCNC: 6.7 G/DL
SODIUM SERPL-SCNC: 139 MMOL/L

## 2023-07-24 ENCOUNTER — NON-APPOINTMENT (OUTPATIENT)
Age: 33
End: 2023-07-24

## 2023-08-01 ENCOUNTER — APPOINTMENT (OUTPATIENT)
Dept: INTERNAL MEDICINE | Facility: CLINIC | Age: 33
End: 2023-08-01
Payer: MEDICAID

## 2023-08-01 VITALS
HEIGHT: 76 IN | DIASTOLIC BLOOD PRESSURE: 87 MMHG | BODY MASS INDEX: 33.49 KG/M2 | SYSTOLIC BLOOD PRESSURE: 125 MMHG | TEMPERATURE: 97.1 F | WEIGHT: 275 LBS | OXYGEN SATURATION: 98 % | HEART RATE: 104 BPM

## 2023-08-01 DIAGNOSIS — E87.6 HYPOKALEMIA: ICD-10-CM

## 2023-08-01 PROCEDURE — 99213 OFFICE O/P EST LOW 20 MIN: CPT | Mod: 25

## 2023-08-01 RX ORDER — HYDROCHLOROTHIAZIDE 25 MG/1
25 TABLET ORAL DAILY
Qty: 30 | Refills: 1 | Status: DISCONTINUED | COMMUNITY
Start: 2023-05-15 | End: 2023-08-01

## 2023-08-01 NOTE — PHYSICAL EXAM
[No Acute Distress] : no acute distress [Well Nourished] : well nourished [Well Developed] : well developed [Well-Appearing] : well-appearing [Normal Sclera/Conjunctiva] : normal sclera/conjunctiva [Normal Oropharynx] : the oropharynx was normal [No Respiratory Distress] : no respiratory distress  [No Accessory Muscle Use] : no accessory muscle use [Clear to Auscultation] : lungs were clear to auscultation bilaterally [Normal Rate] : normal rate  [Regular Rhythm] : with a regular rhythm [Normal S1, S2] : normal S1 and S2 [No Murmur] : no murmur heard [No Edema] : there was no peripheral edema [No Rash] : no rash [Normal Gait] : normal gait [de-identified] : odd affect, jittery

## 2023-08-01 NOTE — HISTORY OF PRESENT ILLNESS
[FreeTextEntry1] : f/u to check potassium and blood pressure [de-identified] : 32 y/o M PMH HTN, schizophrenia here to f/u blood pressure and check potassium. He was last here on 7/10, found to have K 3.3, and switched from HCTZ to triamterene/HCTZ combo. He does not have any complaints today. He still smokes cigarettes and does not have interest in stopping now. He does not drink EtOH or use recreational drugs but drinks a lot of coffee. He does not have a bp cuff but plans on buying a watch that checks BP instead. He has started riding his bike more.

## 2023-08-01 NOTE — HEALTH RISK ASSESSMENT
[0] : 2) Feeling down, depressed, or hopeless: Not at all (0) [PHQ-2 Negative - No further assessment needed] : PHQ-2 Negative - No further assessment needed [[id number]] : 0

## 2023-08-01 NOTE — REVIEW OF SYSTEMS
[Fever] : no fever [Chills] : no chills [Night Sweats] : no night sweats [Vision Problems] : no vision problems [Chest Pain] : no chest pain [Palpitations] : no palpitations [Lower Ext Edema] : no lower extremity edema [Orthopena] : no orthopnea [Shortness Of Breath] : no shortness of breath [Cough] : no cough [Nausea] : no nausea [Vomiting] : no vomiting [Joint Pain] : no joint pain [Back Pain] : no back pain [Headache] : no headache

## 2023-08-02 LAB
ANION GAP SERPL CALC-SCNC: 15 MMOL/L
BUN SERPL-MCNC: 14 MG/DL
CALCIUM SERPL-MCNC: 9.6 MG/DL
CHLORIDE SERPL-SCNC: 102 MMOL/L
CO2 SERPL-SCNC: 23 MMOL/L
CREAT SERPL-MCNC: 0.86 MG/DL
EGFR: 117 ML/MIN/1.73M2
GLUCOSE SERPL-MCNC: 97 MG/DL
MAGNESIUM SERPL-MCNC: 2 MG/DL
POTASSIUM SERPL-SCNC: 4 MMOL/L
SODIUM SERPL-SCNC: 140 MMOL/L

## 2023-08-04 ENCOUNTER — APPOINTMENT (OUTPATIENT)
Dept: GASTROENTEROLOGY | Facility: CLINIC | Age: 33
End: 2023-08-04
Payer: MEDICAID

## 2023-08-04 VITALS
RESPIRATION RATE: 15 BRPM | DIASTOLIC BLOOD PRESSURE: 87 MMHG | OXYGEN SATURATION: 98 % | HEIGHT: 76 IN | TEMPERATURE: 97.9 F | SYSTOLIC BLOOD PRESSURE: 137 MMHG | BODY MASS INDEX: 33.49 KG/M2 | WEIGHT: 275 LBS | HEART RATE: 98 BPM

## 2023-08-04 DIAGNOSIS — K59.09 OTHER CONSTIPATION: ICD-10-CM

## 2023-08-04 PROCEDURE — 99203 OFFICE O/P NEW LOW 30 MIN: CPT

## 2023-08-04 NOTE — HISTORY OF PRESENT ILLNESS
[FreeTextEntry1] : 33M with a h/o schizophrenia/depression, obesity, and HTN who presents for blood in stool. Patient has had treatment for hemorrhoids last year. After therapy, he did not see blood, but over the course of the past few days/weeks he has noted a couple episodes of scant blood with stool. Prior to this, he feels that his anus was irritated and dry. He was concerned that this was his hemorrhoids causing symptoms again. Otherwise, he intermittently gets constipation, but is unclear how much fiber is in his diet. He stays well hydrated daily. Denies abd pain, though when constipated has a full feeling in his right side. No heartburn, n/v.   Had a colonoscopy with Dr. Colt Cade last year and results were reportedly wnl.   Family Hx: no GI malignancy, CD, UC   Social Hx: current pack a day smoker x10 years, no EtOH, no recreational drugs, works for family as

## 2023-08-04 NOTE — PHYSICAL EXAM
[Alert] : alert [No Acute Distress] : no acute distress [Sclera] : the sclera and conjunctiva were normal [Hearing Threshold Finger Rub Not [place]] : hearing was normal [No Respiratory Distress] : no respiratory distress [No Acc Muscle Use] : no accessory muscle use [Respiration, Rhythm And Depth] : normal respiratory rhythm and effort [Heart Rate And Rhythm] : heart rate was normal and rhythm regular [Abdomen Tenderness] : non-tender [Abdomen Soft] : soft [Normal Sphincter Tone] : normal sphincter tone [] : no rash [No Focal Deficits] : no focal deficits [Oriented To Time, Place, And Person] : oriented to person, place, and time [de-identified] : +internal and external hemorrhoids

## 2023-08-04 NOTE — ASSESSMENT
[FreeTextEntry1] : 33M with a h/o schizophrenia/depression, obesity, and HTN who presents for blood in stool.   #Constipation #Hematochezia #Hemorrhoids - hemorrhoids appreciated on rectal exam both internal and external - advised sitz baths to prevent irritation  - advised fiber for constipation, and if no improvement miralax  - will obtain c'scope records from Dr. Cade's office   Donald Calvillo MD Gastroenterology

## 2023-08-06 ENCOUNTER — RX RENEWAL (OUTPATIENT)
Age: 33
End: 2023-08-06

## 2023-08-10 ENCOUNTER — APPOINTMENT (OUTPATIENT)
Dept: INTERNAL MEDICINE | Facility: CLINIC | Age: 33
End: 2023-08-10
Payer: MEDICAID

## 2023-08-10 VITALS
OXYGEN SATURATION: 98 % | SYSTOLIC BLOOD PRESSURE: 129 MMHG | TEMPERATURE: 97.1 F | HEART RATE: 96 BPM | HEIGHT: 76 IN | DIASTOLIC BLOOD PRESSURE: 81 MMHG | BODY MASS INDEX: 33 KG/M2 | WEIGHT: 271 LBS

## 2023-08-10 DIAGNOSIS — R42 DIZZINESS AND GIDDINESS: ICD-10-CM

## 2023-08-10 DIAGNOSIS — R51.9 HEADACHE, UNSPECIFIED: ICD-10-CM

## 2023-08-10 DIAGNOSIS — I10 ESSENTIAL (PRIMARY) HYPERTENSION: ICD-10-CM

## 2023-08-10 PROCEDURE — 99214 OFFICE O/P EST MOD 30 MIN: CPT | Mod: GC,25

## 2023-08-10 RX ORDER — TRIAMTERENE AND HYDROCHLOROTHIAZIDE 37.5; 25 MG/1; MG/1
37.5-25 CAPSULE ORAL DAILY
Qty: 90 | Refills: 1 | Status: DISCONTINUED | COMMUNITY
Start: 2023-07-12 | End: 2023-08-10

## 2023-08-10 RX ORDER — METFORMIN ER 500 MG 500 MG/1
500 TABLET ORAL DAILY
Qty: 90 | Refills: 2 | Status: DISCONTINUED | COMMUNITY
Start: 2023-07-10 | End: 2023-08-10

## 2023-08-10 NOTE — PLAN
[FreeTextEntry1] : #HTN Patient reports HA and dizziness with medication use. Patient most likely hypotensive iso medication use. He does not have BP cuff at home, however states he ordered a watch in which takes BP. He states he would not use a BP cuff if it were prescribed - given weight loss and /81 today w stopping triamterene-HCTZ 3 days ago, can monitor off for now - f/up next week to assess BP. Patient will bring in BP watch to assess accuracy of device  #obesity  Patient with BMI 33. He has lost 14 lbs since Feb. Patient has scale at home that he will continue to use. In terms of medication, he recently had a nosebleed in which he attributes to metformin use. Patient was counseled on common side effects of metformin, such as GI upset. Nosebleeds not a listed side effect of medication.  - given weight loss, will discontinue metformin for now. Patient not willing to restart medication given symptoms.  - continue to monitor weight loss  RDV f/up in 1 week to assess BP

## 2023-08-10 NOTE — END OF VISIT
[] : Resident [FreeTextEntry3] : 33M w/HTN, schizophrenia, obesity, hemorrhoids who presents to [facility] clinic iso adverse side effects 2/2 meds #HTN: hasn't been taking meds for past several days, reports feeling dizzy w/HA last year as well while on HCTZ. can hold for now, will come back to clinic in 1.5 weeks for BP check. Otherwise has lost weight and has been exercising, so BP may be controlled off meds.  #Obesity: weight improved since beginning of this year, does not want to take metformin, thinks it led to nosebleed.

## 2023-08-10 NOTE — ASSESSMENT
[FreeTextEntry1] : Patient is a 32 yo M w PMHx HTN, schizophrenia, obesity, hemorrhoids who presents to [facility] clinic iso adverse side effects in which he attributes to medications.

## 2023-08-10 NOTE — HEALTH RISK ASSESSMENT
[0] : 2) Feeling down, depressed, or hopeless: Not at all (0) [PHQ-2 Negative - No further assessment needed] : PHQ-2 Negative - No further assessment needed [[id number]] : 0

## 2023-08-10 NOTE — HISTORY OF PRESENT ILLNESS
[FreeTextEntry1] : adverse effects to medications  [de-identified] : Patient is a 32 yo M w PMHx HTN, schizophrenia, obesity, hemorrhoids who presents to [facility] clinic iso adverse side effects in which he attributes to medications.  He states last week, he has a nosebleed in which he attributed to metformin use. He states he has since thrown away his prescription and has not taken it He wishes to discontinue. He states he has a scale at home and believes he has lost about 15 lbs this year.  He states since starting new antihypertensive (triamterene- HCTZ combo started iso hypokalemia). He states he has had R sided headaches and dizziness/room spinning sensation. He has not taken medication in 3 days and symptoms have resolved Of note, saw GI specialist 8/4 iso blood in stool- dx with internal and external hemorrhoids and told to take fiber supplementation

## 2023-08-15 ENCOUNTER — EMERGENCY (EMERGENCY)
Facility: HOSPITAL | Age: 33
LOS: 1 days | Discharge: HOME | End: 2023-08-15
Attending: STUDENT IN AN ORGANIZED HEALTH CARE EDUCATION/TRAINING PROGRAM | Admitting: STUDENT IN AN ORGANIZED HEALTH CARE EDUCATION/TRAINING PROGRAM
Payer: COMMERCIAL

## 2023-08-15 VITALS
OXYGEN SATURATION: 99 % | TEMPERATURE: 98 F | DIASTOLIC BLOOD PRESSURE: 78 MMHG | RESPIRATION RATE: 18 BRPM | HEART RATE: 98 BPM | HEIGHT: 76 IN | WEIGHT: 274.92 LBS | SYSTOLIC BLOOD PRESSURE: 128 MMHG

## 2023-08-15 PROCEDURE — 99283 EMERGENCY DEPT VISIT LOW MDM: CPT | Mod: 25

## 2023-08-15 PROCEDURE — 99283 EMERGENCY DEPT VISIT LOW MDM: CPT

## 2023-08-16 NOTE — ED ADULT NURSE NOTE - NSFALLUNIVINTERV_ED_ALL_ED
Bed/Stretcher in lowest position, wheels locked, appropriate side rails in place/Call bell, personal items and telephone in reach/Instruct patient to call for assistance before getting out of bed/chair/stretcher/Non-slip footwear applied when patient is off stretcher/[place] to call system/Physically safe environment - no spills, clutter or unnecessary equipment/Purposeful proactive rounding/Room/bathroom lighting operational, light cord in reach

## 2023-08-16 NOTE — ED PROVIDER NOTE - OBJECTIVE STATEMENT
33yM here for evaluation of pain and redness to lateral nail bed of L great toe for few days after cutting his nail too short. Has hx of paronychia to this toe few years ago

## 2023-08-16 NOTE — ED PROVIDER NOTE - PATIENT PORTAL LINK FT
You can access the FollowMyHealth Patient Portal offered by [facility] by registering at the following website: [email or web address]. By joining [employer]’s FollowMyHealth portal, you will also be able to view your health information using other applications (apps) compatible with our system.

## 2023-08-16 NOTE — ED PROVIDER NOTE - PHYSICAL EXAMINATION
CONST: nontoxic NAD speaking in full sentences  HEAD: atraumatic  EYES: conjunctivae clear  NECK: supple  CARD: regular rate  CHEST: breathing comfortably, no stridor/retractions/tripoding  EXT: L toe w/ erythema and tenderness to lateral toenail  SKIN: warm, dry  NEURO: awake alert answering questions following commands moving all extremities

## 2023-08-16 NOTE — ED ADULT NURSE NOTE - NSICDXPASTMEDICALHX_GEN_ALL_CORE_FT
PAST MEDICAL HISTORY:  Chronic ankle pain     No pertinent past medical history     Stress fracture of ankle

## 2023-08-16 NOTE — ED PROVIDER NOTE - OBJECTIVE STATEMENT
Spoke with Carole about the clinical trial TCR2 ([id number]).  Dr. Braun and I were planning on enrolling her into the screening phase, but the trial is now on hold.  I told her I would still keep her on my radar in case other studies open.  I notified Dr. Gilbert who's seeing her on Monday and Elva Bullock who's seeing her today.     29 M healthy presents for suture removal of wound repaired to R hand one week prior.  Pt had two suture placed to R thenar eminence, reports compliance with wound care- no discharge/bleeding, swelling/redness, numbness/weakness or other concerns.  Was told to come back today for suture removal.

## 2023-08-16 NOTE — ED ADULT NURSE NOTE - OBJECTIVE STATEMENT
33y M presenting to ED with left big toe pain. Pt states he has had pain all day, 4/4 pain, and has been "messing" with it. States some white/yellow puss came out. Toe red on assessment.

## 2023-08-16 NOTE — ED PROVIDER NOTE - NSFOLLOWUPINSTRUCTIONS_ED_ALL_ED_FT
You can follow up in podiatry clinic on [address] Call to arrange appointment [phone]     Make sure to do warm soaks two times a day for 15-20 min. Place bacitracin or neosporin (both over the counter) to the affected area. Take antibiotics as prescribed.    Paronychia    Paronychia is an infection of the skin. It happens near a fingernail or toenail. It may cause pain and swelling around the nail. In some cases, a fluid-filled bump (abscess) can form near or under the nail.    Often, this condition is not serious, and it clears up with treatment.    What are the causes?  This condition may be caused by a germ. The germ may be bacteria or a fungus. These germs can enter the body through an opening in the skin, such as a cut or a hangnail. Other causes include:  Repeated injuries to your fingernails or toenails.  Irritation of the base and sides of the nail (cuticle).  What increases the risk?  This condition is more likely to develop in people who:  Get their hands wet often, such as a .  Bite their fingernails or the base and sides of their nails.  Have other skin problems.  Have hangnails or hurt fingertips.  Come into contact with chemicals like detergents.  Have diabetes.  What are the signs or symptoms?  Redness and swelling of the skin near the nail.  A tender feeling around the nail.  Pus-filled bumps under the skin at the base and sides of the nail.  Fluid or pus under the nail.  Pain in the area.  How is this treated?  Treatment depends on the cause of your condition and how bad it is. If your condition is mild, it may clear up on its own in a few days or after soaking in warm water. If needed, treatment may include:  Antibiotic medicine.  Antifungal medicine.  A procedure to drain pus from a fluid-filled bump.  Medicine to treat irritation and swelling (corticosteroids).  Taking off part of an ingrown toenail.  A bandage (dressing) may be placed over the nail area.    Follow these instructions at home:  Wound care    Keep the affected area clean.  Soak the fingers or toes in warm water as told by your doctor. You may be told to do this for 20 minutes, 2–3 times a day.  Keep the area dry when you are not soaking it.  Do not try to drain a fluid-filled bump on your own.  Follow instructions from your doctor about how to take care of the affected area. Make sure you:  Wash your hands with soap and water for at least 20 seconds before and after you change your bandage. If you cannot use soap and water, use hand .  Change your bandage as told by your doctor.  If you had a fluid-filled bump and your doctor drained it, check the area every day for signs of infection. Check for:  Redness, swelling, or pain.  Fluid or blood.  Warmth.  Pus or a bad smell.  Medicines    A prescription pill bottle with an example of a pill.  Take over-the-counter and prescription medicines only as told by your doctor.  If you were prescribed an antibiotic medicine, take it as told by your doctor. Do not stop taking it even if you start to feel better.  General instructions    Avoid contact with anything that irritates your skin or that you are allergic to.  Do not pick at the affected area.  Keep all follow-up visits.  Prevention    To prevent this condition from happening again:  Wear rubber gloves when putting your hands in water for washing dishes or other tasks.  Wear gloves if your hands might touch  or chemicals.  Avoid injuring your nails or fingertips.  Do not bite your nails or tear hangnails.  Do not cut your nails very short.  Do not cut the skin at the base and sides of the nail.  Use clean nail clippers or scissors when trimming nails.  Contact a doctor if:  You feel worse.  You do not get better.  You keep having or you have more fluid, blood, or pus coming from the affected area.  Your affected finger, toe, or joint gets swollen or hard to move.  You have a fever or chills.  There is redness spreading from the affected area.  Summary  Paronychia is an infection of the skin. It happens near a fingernail or toenail.  This condition may cause pain and swelling around the nail.  Soak the fingers or toes in warm water as told by your doctor.  Often, this condition is not serious, and it clears up with treatment.  This information is not intended to replace advice given to you by your health care provider. Make sure you discuss any questions you have with your health care provider.    Document Revised: 03/21/2022 Document Reviewed: 03/21/2022  Elsevier Patient Education © 2023 Elsevier Inc.

## 2023-08-17 DIAGNOSIS — M79.675 PAIN IN LEFT TOE(S): ICD-10-CM

## 2023-08-17 DIAGNOSIS — L03.032 CELLULITIS OF LEFT TOE: ICD-10-CM

## 2023-09-26 ENCOUNTER — APPOINTMENT (OUTPATIENT)
Dept: INTERNAL MEDICINE | Facility: CLINIC | Age: 33
End: 2023-09-26

## 2023-09-26 ENCOUNTER — OUTPATIENT (OUTPATIENT)
Dept: OUTPATIENT SERVICES | Facility: HOSPITAL | Age: 33
LOS: 1 days | End: 2023-09-26
Payer: COMMERCIAL

## 2023-09-26 PROCEDURE — 73630 X-RAY EXAM OF FOOT: CPT | Mod: 26,LT,RT

## 2023-09-26 PROCEDURE — 73630 X-RAY EXAM OF FOOT: CPT

## 2023-10-06 NOTE — ED ADULT NURSE NOTE - SUICIDE SCREENING QUESTION 3
separate 3 mm nodules right upper lobe contiguous with the major fissure images 46 and 47, stable         2 adjacent nodules superior segment right lower lobe measuring 2 and 5 mm, respectively, image 53, stable         Potentially significant incidental findings (lung RADS category S)-enlarged precarinal lymph node measuring 17, unchanged         Other incidental findings-aortic arch calcification; valvular prosthetic device              Impression         Lung RADS category 4A with dominant 11 mm pulmonary nodule left upper lobe with increase in size since June 2020. Lung RADS category S-stable precarinal lymph node, mildly enlarged by short axis      PET scan 39/70/3932  No hypermetabolic uptake in any nodule or the precarinal lymphadenopathy. See media tab for full report    CTA chest Nisa 15, 2020  Chest    1. No findings to suggest aortic dissection. 2.  Scattered bilateral pulmonary nodules, the largest within the left upper lobe. Current    guidelines suggest CT follow-up in 3-6 months to evaluate stability. 3.  Cardiomegaly in the presence of changes related to aortic and mitral valve prostheses. Last PFTs:  None on file    Assessment:      Diagnosis Orders   1. COPD, mild ([address])        2. Pulmonary nodules        3. Mediastinal lymphadenopathy            Plan:     1. CT Chest 10/2023 shows unchanged multiple small pulmonary nodules over 2 years. No further CT Chest needed as last cigarette over 15 years ago  2. COPD - asymptomatic. No daily treatment needed. Uses albuterol about once a month or less  3. Dense breast tissue seen on CT and mammogram was recommended. Last mammogram was 2015. Will notify Aparna Petersen and Dr. Isa Boswell about recommendation  4.   Follow-up in 12 months
No

## 2023-10-24 ENCOUNTER — APPOINTMENT (OUTPATIENT)
Dept: INTERNAL MEDICINE | Facility: CLINIC | Age: 33
End: 2023-10-24

## 2023-11-14 ENCOUNTER — APPOINTMENT (OUTPATIENT)
Dept: INTERNAL MEDICINE | Facility: CLINIC | Age: 33
End: 2023-11-14

## 2023-11-21 ENCOUNTER — APPOINTMENT (OUTPATIENT)
Dept: INTERNAL MEDICINE | Facility: CLINIC | Age: 33
End: 2023-11-21

## 2023-12-12 ENCOUNTER — APPOINTMENT (OUTPATIENT)
Dept: INTERNAL MEDICINE | Facility: CLINIC | Age: 33
End: 2023-12-12

## 2024-01-05 ENCOUNTER — EMERGENCY (EMERGENCY)
Facility: HOSPITAL | Age: 34
LOS: 1 days | Discharge: HOME | End: 2024-01-05
Attending: EMERGENCY MEDICINE | Admitting: EMERGENCY MEDICINE
Payer: COMMERCIAL

## 2024-01-05 VITALS
RESPIRATION RATE: 16 BRPM | SYSTOLIC BLOOD PRESSURE: 147 MMHG | HEIGHT: 76 IN | WEIGHT: 294.98 LBS | TEMPERATURE: 98 F | HEART RATE: 93 BPM | OXYGEN SATURATION: 97 % | DIASTOLIC BLOOD PRESSURE: 90 MMHG

## 2024-01-05 VITALS
RESPIRATION RATE: 16 BRPM | HEART RATE: 92 BPM | OXYGEN SATURATION: 98 % | DIASTOLIC BLOOD PRESSURE: 76 MMHG | SYSTOLIC BLOOD PRESSURE: 127 MMHG

## 2024-01-05 PROCEDURE — 99284 EMERGENCY DEPT VISIT MOD MDM: CPT

## 2024-01-05 PROCEDURE — 99283 EMERGENCY DEPT VISIT LOW MDM: CPT

## 2024-01-05 RX ORDER — HYDROCORTISONE 1 %
1 OINTMENT (GRAM) TOPICAL
Qty: 1 | Refills: 0
Start: 2024-01-05 | End: 2024-01-09

## 2024-01-05 RX ORDER — DOCUSATE SODIUM 100 MG
1 CAPSULE ORAL
Qty: 14 | Refills: 0
Start: 2024-01-05 | End: 2024-01-11

## 2024-01-05 NOTE — ED PROVIDER NOTE - PROVIDER TOKENS
PROVIDER:[TOKEN:[[id number]]],PROVIDER:[TOKEN:[[id number]]] PROVIDER:[TOKEN:[[id number]]],PROVIDER:[TOKEN:[[id number]]]

## 2024-01-05 NOTE — ED PROVIDER NOTE - PHYSICAL EXAMINATION
VITAL SIGNS: I have reviewed nursing notes and confirm.  CONSTITUTIONAL: Well-developed; in no acute distress.   SKIN:  warm and dry, no acute rash.   HEAD:  normocephalic, atraumatic.  EYES: PERRL, EOM intact; conjunctiva and sclera clear.  ENT: No nasal discharge; airway clear.   NECK: Supple; non tender.  CARD: S1, S2 normal; no murmurs, gallops, or rubs. Regular rate and rhythm.   RESP:  Clear to auscultation b/l, no wheezes, rales or rhonchi.  ABD: Normal bowel sounds; soft; non-distended; non-tender; no guarding/ rebound.  RECTUM: Pt has small anal fissure at 6 o'clock position. He has non-thrombosed external hemorrhoid. BRB on tip of glove after internal exam. No palpable internal hemorrhoid.  EXT: Normal ROM. No clubbing, cyanosis or edema. 2+ pulses to b/l ue/le.  NEURO: Alert, oriented, grossly unremarkable  PSYCH: Cooperative, mood and affect appropriate.

## 2024-01-05 NOTE — ED PROVIDER NOTE - NS ED ROS FT
Constitutional: No fever. No chills.  Eyes: No redness. No discharge. No vision change.   ENT: No sore throat. No ear pain.  Cardiovascular: No chest pain. No leg swelling.  Respiratory: No cough. No shortness of breath.  GI: No abdominal pain. No vomiting. No diarrhea. +constipation. +rectal bleeding.   MSK: No joint pain. No back pain.   Skin: No rash. No abrasions.   Neuro: No numbness. No weakness.   Psych: No known mental health issues.

## 2024-01-05 NOTE — ED PROVIDER NOTE - NSFOLLOWUPINSTRUCTIONS_ED_ALL_ED_FT
Hemorrhoids:    Refrain from straining or lingering (eg, reading) on the toilet.    Limit your intake of fatty foods and alcohol, which can make constipation worse.    Dibucaine 1% rectal ointment: up to 4 times per day  -Prescription sent to pharmacy  -Temporary relief of acute pain and itching  -Intended for short-term, intermittent use    Witch hazel pads: as needed up to six times per day or after each bowel movement  -Available over the counter  -Provides temporary relief of itching and irritation.  -Witch hazel may dry secretions and tighten tissues    Hydrocortisone rectal cream 2.5% (Anusol-HC): up to twice daily  -Prescription sent to pharmacy  -Provides local anti-inflammatory and analgesic effect  -For intermittent short-term use (ie, no more than 7 days) due to risk of mucosal thinning    Methylcellulose (fiber supplement): initially 1 tablespoon (?2 grams fiber) or 4 caplets (500 mg fiber per caplet) once per day; may increase to 1 tablespoon or 4 caplets three times per day  -Available over the counter  -Reduces bleeding and symptoms in hemorrhoids; gradual onset of effect over 6 weeks or more.  -Useful in patients with inadequate dietary fiber intake who may be at risk for constipation or diarrhea.  -Adequate oral fluid intake required; take with 180 to 360 mL (6 to 12 ounces) water or fruit juice.  -Gradually increase dose as needed to minimize gas and bloating    Docusate sodium (colace): 100mg twice a day  -Available over the counter  -Softens stool and decreases straining    Sitz bath instructions:  1. Make sure your bathtub is clean. Fill a clean bathtub with 3 to 4 inches of warm water.   2. Sit in the water for 10 to 20 minutes.  3. Add more warm water as needed to keep the water comfortable.  4. Get up slowly from the tub or toilet. You may feel lightheaded or dizzy. Hold on to a railing. Or ask for help from a family member, friend, or caregiver if needed.  5. Gently pat your anal area, perineum, and genitals dry with a clean towel. Don’t rub the area.    You have been referred to colorectal surgery. Please reach out to María Younger ([facility] clinical referral coordinator) to assist you with your follow-up appointment.     Monday - Friday 11am-7pm  [phone]  [email or web address] Hemorrhoids:    Refrain from straining or lingering (eg, reading) on the toilet.    Limit your intake of fatty foods and alcohol, which can make constipation worse.    Dibucaine 1% rectal ointment: up to 4 times per day  -Prescription sent to pharmacy  -Temporary relief of acute pain and itching  -Intended for short-term, intermittent use    Witch hazel pads: as needed up to six times per day or after each bowel movement  -Available over the counter  -Provides temporary relief of itching and irritation.  -Witch hazel may dry secretions and tighten tissues    Hydrocortisone rectal cream 2.5% (Anusol-HC): up to twice daily  -Prescription sent to pharmacy  -Provides local anti-inflammatory and analgesic effect  -For intermittent short-term use (ie, no more than 7 days) due to risk of mucosal thinning    Methylcellulose (fiber supplement): initially 1 tablespoon (?2 grams fiber) or 4 caplets (500 mg fiber per caplet) once per day; may increase to 1 tablespoon or 4 caplets three times per day  -Available over the counter  -Reduces bleeding and symptoms in hemorrhoids; gradual onset of effect over 6 weeks or more.  -Useful in patients with inadequate dietary fiber intake who may be at risk for constipation or diarrhea.  -Adequate oral fluid intake required; take with 180 to 360 mL (6 to 12 ounces) water or fruit juice.  -Gradually increase dose as needed to minimize gas and bloating    Docusate sodium (colace): 100mg twice a day  -Available over the counter  -Softens stool and decreases straining    Sitz bath instructions:  1. Make sure your bathtub is clean. Fill a clean bathtub with 3 to 4 inches of warm water.   2. Sit in the water for 10 to 20 minutes.  3. Add more warm water as needed to keep the water comfortable.  4. Get up slowly from the tub or toilet. You may feel lightheaded or dizzy. Hold on to a railing. Or ask for help from a family member, friend, or caregiver if needed.  5. Gently pat your anal area, perineum, and genitals dry with a clean towel. Don’t rub the area.    You have been referred to colorectal surgery. Please reach out to María Younger ([facility] clinical referral coordinator) to assist you with your follow-up appointment.     Monday - Friday 11am-7pm  [phone]  [email or web address]

## 2024-01-05 NOTE — ED PROVIDER NOTE - CLINICAL SUMMARY MEDICAL DECISION MAKING FREE TEXT BOX
Pt is hemodynamically stable. He has small anal fissure and non-thrombosed external hemorrhoid on exam. Will treat with anusol hc, sitz baths, colace. Counseled on supportive care. Will refer to colorectal surgery. Return precautions given.

## 2024-01-05 NOTE — ED PROVIDER NOTE - OBJECTIVE STATEMENT
32yo male with pmhx of depression and hemorrhoids presents with rectal bleeding this morning. Pt states he was straining to have a bowel movement, performed "prostate massage" then noted blood when he produced a bowel movement. He reports mild discomfort at rectum. He states he typically has constipation. He denies nsaid or anticoagulant use. He denies fever, abdominal pain, vomiting.

## 2024-01-05 NOTE — ED PROVIDER NOTE - PATIENT PORTAL LINK FT
You can access the FollowMyHealth Patient Portal offered by [facility] by registering at the following website: [email or web address]. By joining [employer]’s FollowMyHealth portal, you will also be able to view your health information using other applications (apps) compatible with our system. You can access the FollowMyHealth Patient Portal offered by [facility] by registering at the following website: [email or web address]. By joining [employer]’s FollowMyHealth portal, you will also be able to view your health information using other applications (apps) compatible with our system.

## 2024-01-05 NOTE — ED PROVIDER NOTE - CARE PROVIDER_API CALL
Jennifer Alegira  Colon/Rectal Surgery  [address], [address]  [place] [address]  Phone: [phone]  Fax: [phone]  Follow Up Time:     Michel Brandon  Colon/Rectal Surgery  [address], [address]  [place] [address]  Phone: [phone]  Fax: [phone]  Follow Up Time:    Jennifer Alegria  Colon/Rectal Surgery  [address], [address]  [place] [address]  Phone: [phone]  Fax: [phone]  Follow Up Time:     Michel Brandon  Colon/Rectal Surgery  [address], [address]  [place] [address]  Phone: [phone]  Fax: [phone]  Follow Up Time:

## 2024-01-05 NOTE — ED ADULT NURSE NOTE - NSFALLUNIVINTERV_ED_ALL_ED
Bed/Stretcher in lowest position, wheels locked, appropriate side rails in place/Call bell, personal items and telephone in reach/Instruct patient to call for assistance before getting out of bed/chair/stretcher/Non-slip footwear applied when patient is off stretcher/[place] to call system/Physically safe environment - no spills, clutter or unnecessary equipment/Purposeful proactive rounding/Room/bathroom lighting operational, light cord in reach Bed/Stretcher in lowest position, wheels locked, appropriate side rails in place/Call bell, personal items and telephone in reach/Instruct patient to call for assistance before getting out of bed/chair/stretcher/Non-slip footwear applied when patient is off stretcher/[place] to call system/Physically safe environment - no spills, clutter or unnecessary equipment/Purposeful proactive rounding/Room/bathroom lighting operational, light cord in reach

## 2024-01-05 NOTE — ED PROVIDER NOTE - ATTENDING APP SHARED VISIT CONTRIBUTION OF CARE
34 yo male h/o depression and hemorrhoids c/o brbpr this am after straining to stool and also doing prostate massage.  No sig rectal pain, bleeding now resolved.  + h/o bleeding w hemorrhoids after straining.  No abd pain, n/v, fever.  Well appearing, nad, nc/at, nl resp effort, abd soft, nt, rectum w + fissure at 6p and ext hemorrhoids, lance per pa exam, ext no gross deformity, no gross neuro deficits   Pt c/o brbpr - suspect 2/2 fissure and/or hemorrhoids.  VSS.  Plan dc w bowel regimen, anusol, sitz bath, fu w pmd, gi and/or colorectal.

## 2024-01-05 NOTE — ED ADULT NURSE NOTE - OBJECTIVE STATEMENT
patient presents to ED with rectal bleeding for 1-2days, hx of hemorrhoid s/p surgery. states bleeding got worse this morning but now resolved. endorsing mild abd pain. denies cp, sob, dizziness, n/v/f/c.

## 2024-01-05 NOTE — ED ADULT TRIAGE NOTE - BP NONINVASIVE DIASTOLIC (MM HG)
Writejohn TONEY for patient to call back and reschedule the appointment for Frieda Botello NP on 11/30/2022 with Bam Frazier NP to next available.    90

## 2024-01-08 DIAGNOSIS — K62.5 HEMORRHAGE OF ANUS AND RECTUM: ICD-10-CM

## 2024-01-08 DIAGNOSIS — K64.4 RESIDUAL HEMORRHOIDAL SKIN TAGS: ICD-10-CM

## 2024-01-08 DIAGNOSIS — K59.00 CONSTIPATION, UNSPECIFIED: ICD-10-CM

## 2024-01-08 DIAGNOSIS — K60.2 ANAL FISSURE, UNSPECIFIED: ICD-10-CM

## 2024-01-08 DIAGNOSIS — F32.A DEPRESSION, UNSPECIFIED: ICD-10-CM

## 2024-01-16 ENCOUNTER — APPOINTMENT (OUTPATIENT)
Dept: INTERNAL MEDICINE | Facility: CLINIC | Age: 34
End: 2024-01-16

## 2024-01-25 ENCOUNTER — APPOINTMENT (OUTPATIENT)
Dept: HEPATOLOGY | Facility: CLINIC | Age: 34
End: 2024-01-25
Payer: MEDICAID

## 2024-01-25 VITALS
HEART RATE: 91 BPM | DIASTOLIC BLOOD PRESSURE: 92 MMHG | HEIGHT: 76 IN | SYSTOLIC BLOOD PRESSURE: 136 MMHG | WEIGHT: 263 LBS | BODY MASS INDEX: 32.03 KG/M2 | OXYGEN SATURATION: 97 % | RESPIRATION RATE: 16 BRPM | TEMPERATURE: 98.9 F

## 2024-01-25 DIAGNOSIS — K64.9 UNSPECIFIED HEMORRHOIDS: ICD-10-CM

## 2024-01-25 DIAGNOSIS — K92.1 MELENA: ICD-10-CM

## 2024-01-25 PROCEDURE — 99203 OFFICE O/P NEW LOW 30 MIN: CPT

## 2024-02-20 ENCOUNTER — APPOINTMENT (OUTPATIENT)
Dept: INTERNAL MEDICINE | Facility: CLINIC | Age: 34
End: 2024-02-20

## 2024-02-22 ENCOUNTER — APPOINTMENT (OUTPATIENT)
Dept: INTERNAL MEDICINE | Facility: CLINIC | Age: 34
End: 2024-02-22
Payer: MEDICAID

## 2024-02-22 VITALS
DIASTOLIC BLOOD PRESSURE: 86 MMHG | OXYGEN SATURATION: 97 % | HEIGHT: 76 IN | SYSTOLIC BLOOD PRESSURE: 136 MMHG | TEMPERATURE: 98 F | BODY MASS INDEX: 31.9 KG/M2 | WEIGHT: 262 LBS | HEART RATE: 103 BPM

## 2024-02-22 DIAGNOSIS — F17.219 NICOTINE DEPENDENCE, CIGARETTES, WITH UNSPECIFIED NICOTINE-INDUCED DISORDERS: ICD-10-CM

## 2024-02-22 PROCEDURE — 99395 PREV VISIT EST AGE 18-39: CPT | Mod: GC

## 2024-02-22 NOTE — REVIEW OF SYSTEMS
[Recent Change In Weight] : ~T recent weight change [Fever] : no fever [Chills] : no chills [Fatigue] : no fatigue [Night Sweats] : no night sweats [Vision Problems] : no vision problems [Chest Pain] : no chest pain [Palpitations] : no palpitations [Lower Ext Edema] : no lower extremity edema [Orthopena] : no orthopnea [Shortness Of Breath] : no shortness of breath [Wheezing] : no wheezing [Cough] : no cough [Abdominal Pain] : no abdominal pain [Dyspnea on Exertion] : not dyspnea on exertion [Nausea] : no nausea [Constipation] : no constipation [Diarrhea] : no diarrhea [Vomiting] : no vomiting [Melena] : no melena [Dysuria] : no dysuria [Back Pain] : no back pain [Skin Rash] : no skin rash [Headache] : no headache [Dizziness] : no dizziness

## 2024-02-22 NOTE — ASSESSMENT
[FreeTextEntry1] : 33M with schizophrenia who presents for annual wellness visit.  #obesity BMI of 31 - discussed healthy lifestyle changes with the patient - reducing carbohydrate, sugar intake and fried food intake. Increase cardiovascular activity  #tobacco use discussed importance of tobacco cessation with the patient, however he reports he is not interested in cutting back at this time. offered nicotine patches/gums, however pt states he has tried it in the past and was not successful advised pt to discuss with us when he is ready to quit and to further create a plan together  #schizophrenia controlled; follows with psych at [facility], [place], operated by [facility]. compliant with olanzapine 15mg PO qD  #elevated blood pressure previously was on HCTZ, self discontinued BP elevated to 130s systolic today. states at home ranges from 120-130s will monitor off BP and continue with lifestyle modifications  HCM vaccinations UTD  on last lipid panel one year ago - discussed healthy lifestyle changes. no need to recheck at this time RTC in 6 months/as needed  Case discussed with Dr. Calvo

## 2024-02-22 NOTE — HEALTH RISK ASSESSMENT
[0] : 2) Feeling down, depressed, or hopeless: Not at all (0) [PHQ-2 Negative - No further assessment needed] : PHQ-2 Negative - No further assessment needed [Good] : ~his/her~  mood as  good [No] : No [With Family] : lives with family [Employed] : employed [Current] : Current [5-9] : 5-9 [[id number]] : 0

## 2024-02-22 NOTE — HISTORY OF PRESENT ILLNESS
[FreeTextEntry1] : cpe visit [de-identified] : 33M with schizophrenia who presents for annual wellness visit. Pt states he feels well, has been making healthier lifestyle choices. Since his last visit, has been off BP medications. Reports he has a watch that he uses to check his BP and his systolics range 120-130s.

## 2024-02-24 PROBLEM — K64.9 HEMORRHOIDS, UNSPECIFIED HEMORRHOID TYPE: Status: ACTIVE | Noted: 2023-08-04

## 2024-02-24 PROBLEM — K92.1 BLOOD IN STOOL: Status: ACTIVE | Noted: 2022-03-11

## 2024-02-24 NOTE — PHYSICAL EXAM
[Non-Tender] : non-tender [General Appearance - Alert] : alert [General Appearance - In No Acute Distress] : in no acute distress [General Appearance - Well Nourished] : well nourished [General Appearance - Well Developed] : well developed [Sclera] : the sclera and conjunctiva were normal [Neck Appearance] : the appearance of the neck was normal [Neck Cervical Mass (___cm)] : no neck mass was observed [] : no respiratory distress [Exaggerated Use Of Accessory Muscles For Inspiration] : no accessory muscle use [Edema] : there was no peripheral edema [Veins - Varicosity Changes] : there were no varicosital changes [Abdomen Soft] : soft [Abdomen Tenderness] : non-tender [Abdomen Mass (___ Cm)] : no abdominal mass palpated [Oriented To Time, Place, And Person] : oriented to person, place, and time [Scleral Icterus] : No Scleral Icterus [Asterixis] : no asterixis observed [Abdominal  Ascites] : no ascites [Jaundice] : No jaundice

## 2024-02-24 NOTE — END OF VISIT
[FreeTextEntry3] : I have seen and examined patient at bedside. I agree with hx, ROS, physical examination, imp/suggestions as written by MICHELLE Blackman. Please see my note.  Bright red rectal bleeding, on episode, resolved, seen in ER, according to him, he had a rectal exam.  Colonoscopy in 2022  High fiber diet  Treatment of constipation  Follow up with GI if symptoms recur

## 2024-02-24 NOTE — HISTORY OF PRESENT ILLNESS
[FreeTextEntry1] : 32 y/o M with hemorrhoid self referred for hemorrhoid. Pt has colonoscopy 7/25/22: Moderate internal hemorrhoids.   Pt went to ED 1/5/24 for rectal bleeding. Since discharge, pt has not had rectal bleeding. Reports he is taking fiber gummies and is helping his hemorrhoid. Today pt reports feeling well. Denies fever, chills, n/v/d/c S OB, CP, HA, dizziness, abd pain, jaundice.      Labs: 6/10/23: AST/ALT 17/17   ALP 75   TB 0.2 Cr 0.91  2/21/23    Triglyceride 95  Cholesterol 176      Procedures: Colonoscopy 7/25/22: Moderate internal hemorrhoids.

## 2024-02-24 NOTE — ASSESSMENT
[FreeTextEntry1] : 34 y/o M with hemorrhoid self referred for hemorrhoid. Pt has colonoscopy 7/25/22: Moderate internal hemorrhoids.   Pt went to ED 1/5/24 for rectal bleeding. Since discharge, pt has not had rectal bleeding. Reports he is taking fiber gummies and is helping his hemorrhoid.  Plans: Hemorrhoids: Advised high fiber diet so he continue to not have constipation. Cont fiber gummies. Discussed if hemorrhoids worsen, consider anusol -HC.

## 2024-02-24 NOTE — REVIEW OF SYSTEMS
[Fever] : no fever [Chills] : no chills [Feeling Poorly] : not feeling poorly [Feeling Tired] : not feeling tired [Nasal Discharge] : no nasal discharge [Nosebleeds] : no nosebleeds [Hoarseness] : no hoarseness [Chest Pain] : no chest pain [Sore Throat] : no sore throat [Leg Claudication] : no intermittent leg claudication [Palpitations] : no palpitations [Lower Ext Edema] : no extremity edema [Shortness Of Breath] : no shortness of breath [SOB on Exertion] : no shortness of breath during exertion [Cough] : no cough [Wheezing] : no wheezing [Vomiting] : no vomiting [Abdominal Pain] : no abdominal pain [Constipation] : no constipation [Diarrhea] : no diarrhea [Melena] : no melena

## 2024-03-12 ENCOUNTER — APPOINTMENT (OUTPATIENT)
Dept: INTERNAL MEDICINE | Facility: CLINIC | Age: 34
End: 2024-03-12

## 2024-03-29 ENCOUNTER — EMERGENCY (EMERGENCY)
Facility: HOSPITAL | Age: 34
LOS: 1 days | Discharge: HOME | End: 2024-03-29
Attending: STUDENT IN AN ORGANIZED HEALTH CARE EDUCATION/TRAINING PROGRAM | Admitting: STUDENT IN AN ORGANIZED HEALTH CARE EDUCATION/TRAINING PROGRAM
Payer: COMMERCIAL

## 2024-03-29 VITALS
OXYGEN SATURATION: 97 % | DIASTOLIC BLOOD PRESSURE: 89 MMHG | HEART RATE: 84 BPM | HEIGHT: 76 IN | RESPIRATION RATE: 17 BRPM | WEIGHT: 274.92 LBS | TEMPERATURE: 98 F | SYSTOLIC BLOOD PRESSURE: 131 MMHG

## 2024-03-29 PROCEDURE — 99282 EMERGENCY DEPT VISIT SF MDM: CPT

## 2024-03-29 PROCEDURE — 99283 EMERGENCY DEPT VISIT LOW MDM: CPT

## 2024-03-29 NOTE — ED ADULT NURSE NOTE - OBJECTIVE STATEMENT
33 y.o male c/o L great toe pain s/p ingrown toe nail removal at doctor's office. "I think the pressure from walking made it start bleeding".

## 2024-03-29 NOTE — ED PROVIDER NOTE - NSFOLLOWUPINSTRUCTIONS_ED_ALL_ED_FT
1. You were seen for bleeding. A copy of any of your resulted labs, imaging, and findings have been provided to you. Make sure to view any test results that may not have yet resulted at the time of your discharge by creating a FollowMyHealth account at: [email or web address] to sign up for FollowMyHealth. Please review all of your lab, imaging, and all other results in their entirety with your primary care doctor.   2. Continue to take your home medications as prescribed.   3. Follow up with your primary care doctor within 48 hours to assess the symptoms you were seen for in the emergency department and to review all results from your visit. If you don't have a doctor, call [phone] to make an appointment.  4. Return immediately to the emergency department for new, persistent, or worsening symptoms or signs. Return immediately to the emergency department if you have chest pain, shortness of breath, loss of consciousness, bleeding, pain, discoloration, or fever.   5. For your for health, you should make healthy food choices and be physically active. Also, you should not smoke or use drugs, and you should not drink alcohol in excess. Please visit [email or web address] for resources and more information.

## 2024-03-29 NOTE — ED ADULT TRIAGE NOTE - BP NONINVASIVE DIASTOLIC (MM HG)
36yo  di/di twins, CHTN, AMA, arrived in labor completely dilated, 1st baby vertex male, delivered precipitously without physician present, 2nd twin female moved to breech presentation, underwent LTCS under general anesthesia, [facility].   
89

## 2024-03-29 NOTE — ED ADULT NURSE NOTE - NSFALLUNIVINTERV_ED_ALL_ED
Bed/Stretcher in lowest position, wheels locked, appropriate side rails in place/Call bell, personal items and telephone in reach/Instruct patient to call for assistance before getting out of bed/chair/stretcher/Non-slip footwear applied when patient is off stretcher/[place] to call system/Physically safe environment - no spills, clutter or unnecessary equipment/Purposeful proactive rounding/Room/bathroom lighting operational, light cord in reach

## 2024-03-29 NOTE — ED PROVIDER NOTE - NS ED ROS FT
Positive: Bleeding of left great toe, nausea   negative: Fever, chills, congestion, cough, chest pain, shortness of breath, lightheadedness, vomiting, diarrhea, abdominal pain, dysuria, hematuria, leg edema, rash, syncope

## 2024-03-29 NOTE — ED ADULT TRIAGE NOTE - CHIEF COMPLAINT QUOTE
Pt c/o L toe bleeding after ingrown toe nail procedure at PCP. "I started walking and I think the pressure made it bleed".

## 2024-03-29 NOTE — ED PROVIDER NOTE - CLINICAL SUMMARY MEDICAL DECISION MAKING FREE TEXT BOX
33-year-old male with past medical history of stress fracture to left ankle, depression, no past surgical history presents with bleeding of the left toe that started after he had a left ingrown toenail procedure performed on his left great toe 45 minutes prior to arrival. On exam, VS are wnl, L great toe covered in coban that has some dried blood stains, surrounding skin appears warm/dry/uninfected, no active bleeding, no blood transference to clean gauze.    ddx: post-procedural bleeding appears resolved, no active bleeding    Plan:  -   Offered to patient to examine his left great toe by removing the Coban, however patient states that the doctor who did the procedure placed a "chemical "on the patient's toe and then wrapped it so he prefers that the Coban stays in place and has not removed.  As there is no active bleeding visible from placing gauze over the Coban, will respect patient's wishes not to remove the Coban as there is no evidence of active bleeding at this time, DP pulse intact.  Instructed patient to follow-up with PMD, patient's PMD told him to remove the Coban tomorrow  and to cover the wound with bacitracin.  Gave return precautions to  return to ER if there is any continued bleeding or worsening symptoms or pain.  Patient states understanding, all the

## 2024-03-29 NOTE — ED ADULT NURSE NOTE - ALCOHOL PRE SCREEN (AUDIT - C)
Patient Specific Otc Recommendations (Will Not Stick From Patient To Patient): Begin the following treatments: cerave sa cream vs amlactin cream daily after showers. Gentle cleansers discussed. Basic skin care reviewed. Detail Level: Zone Statement Selected

## 2024-03-29 NOTE — ED PROVIDER NOTE - PHYSICAL EXAMINATION
GENERAL:  Awake, alert and in NAD, non-toxic appearing  ENMT: Airway patent  EYES: conjunctiva clear  CARDIAC: Regular rate, regular rhythm.  Heart sounds S1, S2, no S3, S4. No murmurs, rubs or gallops.  RESPIRATORY: Breath sounds clear and equal in bilateral anterior lung fields, no wheezes/ronchi/crackles/stridor; pt breathing and speaking comfortably with no increased WOB, no accessory mm. use, no intercostal retractions, no nasal flaring  GI: abdomen soft, non-distended, non-tender, no rebound or guarding.  SKIN: warm and dry, no rashes  PSYCH: awake, alert, calm and cooperative  EXTREMITIES: no ble edema  SILT bilat L4-S1  L great toe: covered in coban, no active oozing, some dried blood on coban, surrounding skin is warm and dry, no active bleeding/edema/erythema/crepitus. 2+ dp pulse on L foot. clean gauze over L great toe shows no blood transfer  NEURO: gcs 15

## 2024-03-29 NOTE — ED PROVIDER NOTE - PATIENT PORTAL LINK FT
You can access the FollowMyHealth Patient Portal offered by [facility] by registering at the following website: [email or web address]. By joining [facility]’s FollowMyHealth portal, you will also be able to view your health information using other applications (apps) compatible with our system.

## 2024-03-29 NOTE — ED PROVIDER NOTE - OBJECTIVE STATEMENT
33-year-old male with past medical history of stress fracture to left ankle, depression, no past surgical history presents with bleeding of the left toe that started after he had a left ingrown toenail procedure performed on his left great toe 45 minutes prior to arrival.  Patient states that after the procedure, his  left great toe was wrapped by his PMD who would also put lidocaine injection in the left toe, after which he noticed blood seeping through  the top of his left sneaker.  Patient denies blood thinner use.   patient denies chest pain, shortness of breath, lightheadedness.  patient states that when he saw the blood, he felt nauseous which has resolved.

## 2024-03-29 NOTE — ED ADULT TRIAGE NOTE - PAIN: PRESENCE, MLM
02/21/23        Ronda Ann  [address], [address]  [place] [address]        Dear Ronda Ann    We are pleased to report normal or stable results from the following studies performed during your last visit.    Resulted Orders   COMPREHENSIVE METABOLIC PANEL   Result Value Ref Range    Fasting Status 12 0 - 999 Hours    Sodium 139 135 - 145 mmol/L    Potassium 3.8 3.4 - 5.1 mmol/L    Chloride 103 97 - 110 mmol/L    Carbon Dioxide 27 21 - 32 mmol/L    Anion Gap 13 7 - 19 mmol/L    Glucose 100 (H) 70 - 99 mg/dL    BUN 22 (H) 6 - 20 mg/dL    Creatinine 0.68 0.51 - 0.95 mg/dL    Glomerular Filtration Rate >90 >=60    BUN/ Creatinine Ratio 32 (H) 7 - 25    Calcium 9.2 8.4 - 10.2 mg/dL    Bilirubin, Total 0.4 0.2 - 1.0 mg/dL    GOT/AST 16 <=37 Units/L    GPT/ALT 33 <64 Units/L    Alkaline Phosphatase 63 45 - 117 Units/L    Albumin 4.3 3.6 - 5.1 g/dL    Protein, Total 7.4 6.4 - 8.2 g/dL    Globulin 3.1 2.0 - 4.0 g/dL    A/G Ratio 1.4 1.0 - 2.4     Your CMP is okay!    If you have any questions about your test results, please call Aneudy Schafer MD, Monday through Friday, 10:00 am. - 3:00 pm. at [phone].    Thank you.     [address] [place]  [address]     complains of pain/discomfort

## 2024-03-31 DIAGNOSIS — L76.22 POSTPROCEDURAL HEMORRHAGE OF SKIN AND SUBCUTANEOUS TISSUE FOLLOWING OTHER PROCEDURE: ICD-10-CM

## 2024-03-31 DIAGNOSIS — L60.0 INGROWING NAIL: ICD-10-CM

## 2024-03-31 DIAGNOSIS — F32.A DEPRESSION, UNSPECIFIED: ICD-10-CM

## 2024-05-20 ENCOUNTER — APPOINTMENT (OUTPATIENT)
Dept: INTERNAL MEDICINE | Facility: CLINIC | Age: 34
End: 2024-05-20
Payer: MEDICAID

## 2024-05-20 VITALS
DIASTOLIC BLOOD PRESSURE: 84 MMHG | BODY MASS INDEX: 32.27 KG/M2 | TEMPERATURE: 98 F | OXYGEN SATURATION: 98 % | HEIGHT: 76 IN | SYSTOLIC BLOOD PRESSURE: 130 MMHG | WEIGHT: 265 LBS | HEART RATE: 101 BPM

## 2024-05-20 DIAGNOSIS — E66.9 OBESITY, UNSPECIFIED: ICD-10-CM

## 2024-05-20 DIAGNOSIS — Z13.1 ENCOUNTER FOR SCREENING FOR DIABETES MELLITUS: ICD-10-CM

## 2024-05-20 DIAGNOSIS — F20.9 SCHIZOPHRENIA, UNSPECIFIED: ICD-10-CM

## 2024-05-20 PROCEDURE — G2211 COMPLEX E/M VISIT ADD ON: CPT | Mod: NC,1L

## 2024-05-20 PROCEDURE — 99214 OFFICE O/P EST MOD 30 MIN: CPT | Mod: 25

## 2024-05-20 NOTE — HISTORY OF PRESENT ILLNESS
[FreeTextEntry1] : Pt is here for a follow up. obesity [de-identified] : Mr. Bowles is a 33-year-old male with schizophrenia, obesity, tobacco use, and HTN presenting to follow-up on his blood sugar. He reports feeling well overall. He drinks three coffees and 2-3 sodas a day. He will drink some water out of the faucet with his hands, but it makes him feel cold. He ends up having to urinate frequently due to this excessive intake. He has tried to change his diet by eating at home more frequently. He is eating a lot of potatoes, chips, and pork. He will eat vegetables out of a can.

## 2024-05-20 NOTE — END OF VISIT
[] : Resident [FreeTextEntry3] : #obesity- been trying to eat more at home- lot of junk food and sodas. check labs. needs to work on diet and exercise a lot more  #schizophrenia- cont f/u psych

## 2024-05-20 NOTE — ASSESSMENT
[FreeTextEntry1] :  33-year-old male with schizophrenia, obesity, tobacco use, and HTN presenting to follow-up on his blood sugar. While patient is eating at home more, his diet still needs improvement.   #Obesity BMI 38.2 - Counseled patient on improving his diet including reducing his chip, soda and pork intake - Obesity is likely contributed to his olanzapine - would consider switching antipsychotic if weight does not improve with diet and exercise - offered to have patient see nutritionist, patient deferred.  - F/u A1C, BMP, and lipid profile  RTC in 6 months for annual physical and influenza vaccine

## 2024-05-21 LAB
ANION GAP SERPL CALC-SCNC: 13 MMOL/L
BUN SERPL-MCNC: 14 MG/DL
CALCIUM SERPL-MCNC: 10 MG/DL
CHLORIDE SERPL-SCNC: 103 MMOL/L
CHOLEST SERPL-MCNC: 139 MG/DL
CO2 SERPL-SCNC: 24 MMOL/L
CREAT SERPL-MCNC: 0.91 MG/DL
EGFR: 113 ML/MIN/1.73M2
ESTIMATED AVERAGE GLUCOSE: 111 MG/DL
GLUCOSE SERPL-MCNC: 68 MG/DL
HBA1C MFR BLD HPLC: 5.5 %
HDLC SERPL-MCNC: 41 MG/DL
LDLC SERPL CALC-MCNC: 83 MG/DL
NONHDLC SERPL-MCNC: 98 MG/DL
POTASSIUM SERPL-SCNC: 4.5 MMOL/L
SODIUM SERPL-SCNC: 139 MMOL/L
TRIGL SERPL-MCNC: 76 MG/DL

## 2024-07-12 ENCOUNTER — EMERGENCY (EMERGENCY)
Facility: HOSPITAL | Age: 34
LOS: 1 days | Discharge: HOME | End: 2024-07-12
Attending: STUDENT IN AN ORGANIZED HEALTH CARE EDUCATION/TRAINING PROGRAM | Admitting: STUDENT IN AN ORGANIZED HEALTH CARE EDUCATION/TRAINING PROGRAM
Payer: COMMERCIAL

## 2024-07-12 VITALS
SYSTOLIC BLOOD PRESSURE: 149 MMHG | OXYGEN SATURATION: 98 % | DIASTOLIC BLOOD PRESSURE: 84 MMHG | WEIGHT: 265 LBS | RESPIRATION RATE: 18 BRPM | HEART RATE: 86 BPM | TEMPERATURE: 99 F

## 2024-07-12 VITALS
OXYGEN SATURATION: 98 % | SYSTOLIC BLOOD PRESSURE: 146 MMHG | HEART RATE: 81 BPM | DIASTOLIC BLOOD PRESSURE: 80 MMHG | RESPIRATION RATE: 16 BRPM | TEMPERATURE: 98 F

## 2024-07-12 DIAGNOSIS — R10.9 UNSPECIFIED ABDOMINAL PAIN: ICD-10-CM

## 2024-07-12 DIAGNOSIS — K64.4 RESIDUAL HEMORRHOIDAL SKIN TAGS: ICD-10-CM

## 2024-07-12 DIAGNOSIS — F32.A DEPRESSION, UNSPECIFIED: ICD-10-CM

## 2024-07-12 LAB
ALBUMIN SERPL ELPH-MCNC: 4.3 G/DL — SIGNIFICANT CHANGE UP (ref 3.3–5)
ALP SERPL-CCNC: 68 U/L — SIGNIFICANT CHANGE UP (ref 40–120)
ALT FLD-CCNC: 13 U/L — SIGNIFICANT CHANGE UP (ref 10–45)
ANION GAP SERPL CALC-SCNC: 9 MMOL/L — SIGNIFICANT CHANGE UP (ref 5–17)
APPEARANCE UR: CLEAR — SIGNIFICANT CHANGE UP
AST SERPL-CCNC: 15 U/L — SIGNIFICANT CHANGE UP (ref 10–40)
BASOPHILS # BLD AUTO: 0.05 K/UL — SIGNIFICANT CHANGE UP (ref 0–0.2)
BASOPHILS NFR BLD AUTO: 1 % — SIGNIFICANT CHANGE UP (ref 0–2)
BILIRUB SERPL-MCNC: 0.3 MG/DL — SIGNIFICANT CHANGE UP (ref 0.2–1.2)
BILIRUB UR-MCNC: NEGATIVE — SIGNIFICANT CHANGE UP
BUN SERPL-MCNC: 10 MG/DL — SIGNIFICANT CHANGE UP (ref 7–23)
CALCIUM SERPL-MCNC: 9.1 MG/DL — SIGNIFICANT CHANGE UP (ref 8.4–10.5)
CHLORIDE SERPL-SCNC: 105 MMOL/L — SIGNIFICANT CHANGE UP (ref 96–108)
CO2 SERPL-SCNC: 23 MMOL/L — SIGNIFICANT CHANGE UP (ref 22–31)
COLOR SPEC: YELLOW — SIGNIFICANT CHANGE UP
CREAT SERPL-MCNC: 0.84 MG/DL — SIGNIFICANT CHANGE UP (ref 0.5–1.3)
DIFF PNL FLD: NEGATIVE — SIGNIFICANT CHANGE UP
EGFR: 117 ML/MIN/1.73M2 — SIGNIFICANT CHANGE UP
EOSINOPHIL # BLD AUTO: 0.04 K/UL — SIGNIFICANT CHANGE UP (ref 0–0.5)
EOSINOPHIL NFR BLD AUTO: 0.8 % — SIGNIFICANT CHANGE UP (ref 0–6)
GLUCOSE SERPL-MCNC: 99 MG/DL — SIGNIFICANT CHANGE UP (ref 70–99)
GLUCOSE UR QL: NEGATIVE MG/DL — SIGNIFICANT CHANGE UP
HCT VFR BLD CALC: 42.1 % — SIGNIFICANT CHANGE UP (ref 39–50)
HGB BLD-MCNC: 14.3 G/DL — SIGNIFICANT CHANGE UP (ref 13–17)
IMM GRANULOCYTES NFR BLD AUTO: 0.2 % — SIGNIFICANT CHANGE UP (ref 0–0.9)
KETONES UR-MCNC: NEGATIVE MG/DL — SIGNIFICANT CHANGE UP
LEUKOCYTE ESTERASE UR-ACNC: NEGATIVE — SIGNIFICANT CHANGE UP
LIDOCAIN IGE QN: 15 U/L — SIGNIFICANT CHANGE UP (ref 7–60)
LYMPHOCYTES # BLD AUTO: 1.59 K/UL — SIGNIFICANT CHANGE UP (ref 1–3.3)
LYMPHOCYTES # BLD AUTO: 33.1 % — SIGNIFICANT CHANGE UP (ref 13–44)
MCHC RBC-ENTMCNC: 29.9 PG — SIGNIFICANT CHANGE UP (ref 27–34)
MCHC RBC-ENTMCNC: 34 GM/DL — SIGNIFICANT CHANGE UP (ref 32–36)
MCV RBC AUTO: 88.1 FL — SIGNIFICANT CHANGE UP (ref 80–100)
MONOCYTES # BLD AUTO: 0.43 K/UL — SIGNIFICANT CHANGE UP (ref 0–0.9)
MONOCYTES NFR BLD AUTO: 8.9 % — SIGNIFICANT CHANGE UP (ref 2–14)
NEUTROPHILS # BLD AUTO: 2.69 K/UL — SIGNIFICANT CHANGE UP (ref 1.8–7.4)
NEUTROPHILS NFR BLD AUTO: 56 % — SIGNIFICANT CHANGE UP (ref 43–77)
NITRITE UR-MCNC: NEGATIVE — SIGNIFICANT CHANGE UP
NRBC # BLD: 0 /100 WBCS — SIGNIFICANT CHANGE UP (ref 0–0)
PH UR: 6.5 — SIGNIFICANT CHANGE UP (ref 5–8)
PLATELET # BLD AUTO: 222 K/UL — SIGNIFICANT CHANGE UP (ref 150–400)
POTASSIUM SERPL-MCNC: 4.3 MMOL/L — SIGNIFICANT CHANGE UP (ref 3.5–5.3)
POTASSIUM SERPL-SCNC: 4.3 MMOL/L — SIGNIFICANT CHANGE UP (ref 3.5–5.3)
PROT SERPL-MCNC: 6.6 G/DL — SIGNIFICANT CHANGE UP (ref 6–8.3)
PROT UR-MCNC: NEGATIVE MG/DL — SIGNIFICANT CHANGE UP
RBC # BLD: 4.78 M/UL — SIGNIFICANT CHANGE UP (ref 4.2–5.8)
RBC # FLD: 13 % — SIGNIFICANT CHANGE UP (ref 10.3–14.5)
SODIUM SERPL-SCNC: 137 MMOL/L — SIGNIFICANT CHANGE UP (ref 135–145)
SP GR SPEC: <1.005 — LOW (ref 1–1.03)
UROBILINOGEN FLD QL: 0.2 MG/DL — SIGNIFICANT CHANGE UP (ref 0.2–1)
WBC # BLD: 4.81 K/UL — SIGNIFICANT CHANGE UP (ref 3.8–10.5)
WBC # FLD AUTO: 4.81 K/UL — SIGNIFICANT CHANGE UP (ref 3.8–10.5)

## 2024-07-12 PROCEDURE — 99285 EMERGENCY DEPT VISIT HI MDM: CPT

## 2024-07-12 PROCEDURE — 36415 COLL VENOUS BLD VENIPUNCTURE: CPT

## 2024-07-12 PROCEDURE — 80053 COMPREHEN METABOLIC PANEL: CPT

## 2024-07-12 PROCEDURE — 74177 CT ABD & PELVIS W/CONTRAST: CPT | Mod: 26,MC

## 2024-07-12 PROCEDURE — 83690 ASSAY OF LIPASE: CPT

## 2024-07-12 PROCEDURE — 81003 URINALYSIS AUTO W/O SCOPE: CPT

## 2024-07-12 PROCEDURE — 96374 THER/PROPH/DIAG INJ IV PUSH: CPT | Mod: XU

## 2024-07-12 PROCEDURE — 99284 EMERGENCY DEPT VISIT MOD MDM: CPT | Mod: 25

## 2024-07-12 PROCEDURE — 74177 CT ABD & PELVIS W/CONTRAST: CPT | Mod: MC

## 2024-07-12 PROCEDURE — 85025 COMPLETE CBC W/AUTO DIFF WBC: CPT

## 2024-07-12 RX ORDER — KETOROLAC TROMETHAMINE 30 MG/ML
15 INJECTION, SOLUTION INTRAMUSCULAR ONCE
Refills: 0 | Status: DISCONTINUED | OUTPATIENT
Start: 2024-07-12 | End: 2024-07-12

## 2024-07-12 RX ADMIN — KETOROLAC TROMETHAMINE 15 MILLIGRAM(S): 30 INJECTION, SOLUTION INTRAMUSCULAR at 12:41

## 2024-07-13 ENCOUNTER — EMERGENCY (EMERGENCY)
Facility: HOSPITAL | Age: 34
LOS: 1 days | Discharge: HOME | End: 2024-07-13
Attending: STUDENT IN AN ORGANIZED HEALTH CARE EDUCATION/TRAINING PROGRAM | Admitting: STUDENT IN AN ORGANIZED HEALTH CARE EDUCATION/TRAINING PROGRAM
Payer: COMMERCIAL

## 2024-07-13 VITALS
DIASTOLIC BLOOD PRESSURE: 94 MMHG | HEART RATE: 83 BPM | SYSTOLIC BLOOD PRESSURE: 155 MMHG | OXYGEN SATURATION: 98 % | TEMPERATURE: 98 F | RESPIRATION RATE: 20 BRPM

## 2024-07-13 PROCEDURE — 99283 EMERGENCY DEPT VISIT LOW MDM: CPT

## 2024-07-13 PROCEDURE — 99284 EMERGENCY DEPT VISIT MOD MDM: CPT

## 2024-07-13 RX ORDER — MINERAL OIL 118 G/118ML
133 ENEMA RECTAL ONCE
Refills: 0 | Status: DISCONTINUED | OUTPATIENT
Start: 2024-07-13 | End: 2024-07-13

## 2024-07-13 RX ORDER — HYDROCORTISONE VALERATE 0.2 %
1 CREAM (GRAM) TOPICAL
Qty: 1 | Refills: 0
Start: 2024-07-13 | End: 2024-07-26

## 2024-07-13 RX ORDER — POLYETHYLENE GLYCOL 3350 1 G/G
17 POWDER ORAL ONCE
Refills: 0 | Status: COMPLETED | OUTPATIENT
Start: 2024-07-13 | End: 2024-07-13

## 2024-07-13 RX ADMIN — POLYETHYLENE GLYCOL 3350 17 GRAM(S): 1 POWDER ORAL at 16:44

## 2024-07-15 DIAGNOSIS — K62.5 HEMORRHAGE OF ANUS AND RECTUM: ICD-10-CM

## 2024-07-15 DIAGNOSIS — K64.4 RESIDUAL HEMORRHOIDAL SKIN TAGS: ICD-10-CM

## 2024-07-22 ENCOUNTER — APPOINTMENT (OUTPATIENT)
Dept: COLORECTAL SURGERY | Facility: CLINIC | Age: 34
End: 2024-07-22
Payer: MEDICAID

## 2024-07-22 VITALS
HEIGHT: 76 IN | HEART RATE: 88 BPM | SYSTOLIC BLOOD PRESSURE: 136 MMHG | BODY MASS INDEX: 32.27 KG/M2 | DIASTOLIC BLOOD PRESSURE: 93 MMHG | OXYGEN SATURATION: 98 % | WEIGHT: 265 LBS | RESPIRATION RATE: 16 BRPM

## 2024-07-22 DIAGNOSIS — K64.9 UNSPECIFIED HEMORRHOIDS: ICD-10-CM

## 2024-07-22 PROCEDURE — 99203 OFFICE O/P NEW LOW 30 MIN: CPT | Mod: 25

## 2024-07-22 PROCEDURE — 46600 DIAGNOSTIC ANOSCOPY SPX: CPT

## 2024-07-22 NOTE — HISTORY OF PRESENT ILLNESS
[FreeTextEntry1] : 34yo male presents for initial evaluation  PMH schizophrenia/depression, obesity, HTN (on meds in past, not currently - trying to manage with diet and exercise), constipation, hemorrhoids Meds olanzapine PSH hemorrhoid laser treatment with GI Dr Cade about 2 years ago  Social - current cigarette smoker and vaping x 16 years, (+) marijuana  Seen previously by GI Dr Hossein Hayward Colonoscopy 7/25/22 performed for guaiac (+) Impression - moderate internal hemorrhoids  Seen most recently by GI Dr Donald Calvillo - seen 8/4/23 c/o blood in stool H/o constipation and hemorrhoids. Advised sitz baths and fiber for constipation, if no improvement miralax  Seen in [facility] ED 7/13/24 c/o blood in stool. Seen day prior in ED with same complaint and was advised to take stool softener and f/u with GI after a CTAP performed was normal. Patient did not start stool softener at time of second ED visit, and reported he felt urge to have BM but couldn't pass so he inserted a finger for rectal stimulation and afterwards noticed blood in toilet. Patient was given a dose of polyethylene glycol in ED, recommend miralax, oral hydration, fiber diet, f/u GI, given rx anusol cream.  Patient has not seen GI since ED visit. No appointment scheduled in EMR.  Patient reports h/o constipation. Drinks iced coffee in morning and tries to have daily BM, admits to pushing and straining prior to ED visit. Since second ED visit, he has been taking Miralax daily and having daily BM and feels improvement evacuating, no longer having to push or strain.  Admits he was told to take fiber supplement in past. Tried it, but "ears turned red" so he stopped.   Has been using the Anusol cream since being seen in ED. Last saw [facility] prior to ED visit, has not seen any since.   Denies ASA or anticoagulation use.

## 2024-07-22 NOTE — PHYSICAL EXAM
[FreeTextEntry1] : Medical assistant present for duration of physical examination   General no acute distress, alert and oriented Psych calm, pleasant demeanor, responding appropriately to questions Nonlabored breathing Ambulating without assistance Skin normal color and pigment, no visible lesions or rashes    Anorectal Exam: Inspection no erythema, induration or fluctuance, no skin excoriation, no fissure, soft small external hemorrhoids without acute inflammation or thrombosis IRENA nontender, no masses palpated, no blood on gloved finger   Procedure: Anoscopy   Pre procedure Diagnosis: hemorrhoids Post procedure Diagnosis: hemorrhoids Anesthesia: none Estimated blood loss: none Specimen: none Complications: none   Consent obtained. Anoscopy was performed by passing a lighted anoscope with lubricant jelly into the anal canal and the entire anal mucosal surface was inspected. Findings included no fissure, moderate internal hemorrhoids without acute inflammation or bleeding, no visible masses or lesions in anal canal   Patient tolerated examination and procedure well.

## 2024-07-22 NOTE — ASSESSMENT
[FreeTextEntry1] : Exam findings and diagnosis were discussed at length with patient.  Recommendations including increased fiber intake, adequate daily hydration were discussed. Avoid constipation and diarrhea, avoid pushing/straining. Continue Miralax. Recommend f/u with GI for continued constipation management. Has Rx for hydrocortisone cream from ED. Discussed as needed use for symptoms.  Has had IRC performed in past. Discussed rubber band ligation ligation as an alternative if symptoms return or persist. Surgery also discussed; patient not interested in surgery at this time due to anticipate recovery associated with surgery. All questions answered, patient expressed understanding and is agreeable to this plan.

## 2025-02-24 ENCOUNTER — APPOINTMENT (OUTPATIENT)
Dept: INTERNAL MEDICINE | Facility: CLINIC | Age: 35
End: 2025-02-24
Payer: MEDICAID

## 2025-02-24 VITALS
SYSTOLIC BLOOD PRESSURE: 113 MMHG | WEIGHT: 262 LBS | TEMPERATURE: 100.1 F | HEART RATE: 117 BPM | BODY MASS INDEX: 31.9 KG/M2 | HEIGHT: 76 IN | DIASTOLIC BLOOD PRESSURE: 66 MMHG | OXYGEN SATURATION: 95 %

## 2025-02-24 DIAGNOSIS — E66.9 OBESITY, UNSPECIFIED: ICD-10-CM

## 2025-02-24 DIAGNOSIS — Z00.00 ENCOUNTER FOR GENERAL ADULT MEDICAL EXAMINATION W/OUT ABNORMAL FINDINGS: ICD-10-CM

## 2025-02-24 DIAGNOSIS — F20.9 SCHIZOPHRENIA, UNSPECIFIED: ICD-10-CM

## 2025-02-24 DIAGNOSIS — J06.9 ACUTE UPPER RESPIRATORY INFECTION, UNSPECIFIED: ICD-10-CM

## 2025-02-24 PROCEDURE — 99395 PREV VISIT EST AGE 18-39: CPT | Mod: GC,25

## 2025-02-24 RX ORDER — ESCITALOPRAM OXALATE 20 MG/1
20 TABLET ORAL DAILY
Qty: 90 | Refills: 0 | Status: ACTIVE | COMMUNITY
Start: 2025-02-24

## 2025-02-24 RX ORDER — FLUTICASONE PROPIONATE 50 UG/1
50 SPRAY, METERED NASAL
Qty: 1 | Refills: 0 | Status: ACTIVE | COMMUNITY
Start: 2025-02-24 | End: 1900-01-01

## 2025-02-25 LAB
ALBUMIN SERPL ELPH-MCNC: 4.6 G/DL
ALP BLD-CCNC: 78 U/L
ALT SERPL-CCNC: 24 U/L
ANION GAP SERPL CALC-SCNC: 13 MMOL/L
AST SERPL-CCNC: 21 U/L
BILIRUB SERPL-MCNC: 0.2 MG/DL
BUN SERPL-MCNC: 10 MG/DL
CALCIUM SERPL-MCNC: 9.5 MG/DL
CHLORIDE SERPL-SCNC: 102 MMOL/L
CHOLEST SERPL-MCNC: 160 MG/DL
CO2 SERPL-SCNC: 23 MMOL/L
CREAT SERPL-MCNC: 0.88 MG/DL
EGFR: 115 ML/MIN/1.73M2
ESTIMATED AVERAGE GLUCOSE: 120 MG/DL
GLUCOSE SERPL-MCNC: 88 MG/DL
HBA1C MFR BLD HPLC: 5.8 %
HCT VFR BLD CALC: 47.4 %
HDLC SERPL-MCNC: 44 MG/DL
HGB BLD-MCNC: 15.1 G/DL
INFLUENZA A RESULT: NOT DETECTED
INFLUENZA B RESULT: NOT DETECTED
LDLC SERPL CALC-MCNC: 100 MG/DL
MCHC RBC-ENTMCNC: 30.3 PG
MCHC RBC-ENTMCNC: 31.9 G/DL
MCV RBC AUTO: 95.2 FL
NONHDLC SERPL-MCNC: 116 MG/DL
PLATELET # BLD AUTO: 240 K/UL
POTASSIUM SERPL-SCNC: 4.3 MMOL/L
PROT SERPL-MCNC: 6.7 G/DL
RBC # BLD: 4.98 M/UL
RBC # FLD: 13.9 %
RESP SYN VIRUS RESULT: NOT DETECTED
SARS-COV-2 RESULT: DETECTED
SODIUM SERPL-SCNC: 138 MMOL/L
TRIGL SERPL-MCNC: 81 MG/DL
WBC # FLD AUTO: 5.81 K/UL

## 2025-02-27 LAB
HBV CORE IGG+IGM SER QL: NONREACTIVE
HBV SURFACE AB SER QL: REACTIVE
HBV SURFACE AB SERPL IA-ACNC: >1000 MIU/ML

## 2025-07-23 NOTE — ED ADULT NURSE NOTE - NS ED NURSE RECORD ANOTHER HT AND WT
Food  Food Helpline   Program Provider: [employer]  Program Website : https://www.[employer].org/programs/[place]-food-helpline/  Next Steps: get more info https://www.[employer]ersolutions.org/programs/[place]-food-helpline/how-can-we-help-you/    Program Locations:   Address:  [address] [place] [id number]   Distance:  2.53 mi     Hours:   Monday: 8:00 AM - 5:00 PM   Tuesday: 8:00 AM - 5:00 PM   Wednesday: 8:00 AM - 5:00 PM   Thursday: 8:00 AM - 5:00 PM   Friday: 8:00 AM - 5:00 PM   Saturday: CLOSED   Sunday: CLOSED     Reduced Cost Groceries   Program Provider: [employer]  Program Website : [email or web address]/groceries/fare-for-all/  Next Steps: Go to [email or web address]/groceries/fare-for-all/schedule/    Program Locations:   Address:  [address] [place] [id number]   Distance:  2.53 mi     Hours:   Monday: 9:00 AM - 5:00 PM   Tuesday: 9:00 AM - 5:00 PM   Wednesday: 9:00 AM - 5:00 PM   Thursday: 9:00 AM - 5:00 PM   Friday: 9:00 AM - 5:00 PM   Saturday: CLOSED   Sunday: CLOSED     Meal Delivery   Program Provider: [employer] [facility]  Program Website : [email or web address]  Program Phone Number: [phone]  Next Steps: Call [phone]    Program Locations:   Address:  [address] [place] [address]   Distance:  7.55 mi   Office Phone Number: [phone]    Hours:   Monday: 8:00 AM - 5:00 PM   Tuesday: 8:00 AM - 5:00 PM   Wednesday: 8:00 AM - 5:00 PM   Thursday: 8:00 AM - 5:00 PM   Friday: 8:00 AM - 5:00 PM   Saturday: CLOSED   Sunday: CLOSED     Supplemental Nutrition Assistance Program (SNAP) Outreach   Program Provider: [facility]  Program Website : https://www.[employer].org/who--how-we-help/services-and-programs/programs/snap-outreach.html#.NzfxERFNl1n  Program Phone Number: [phone]  Next Steps: Go to  https://www.[employer].org/who--how-we-help/services-and-programs/programs/snap-outreach.html#.AvfyXMMVs4u    Program Locations:   Address:  [address] [place] [address]   Distance:  14.54 mi   Office Phone Number: [phone]    Hours:   Monday: 8:00 AM - 4:30 PM   Tuesday: 8:00 AM - 4:30 PM   Wednesday: 8:00 AM - 4:30 PM   Thursday: 8:00 AM - 4:30 PM   Friday: 8:00 AM - 4:30 PM   Saturday: CLOSED   Sunday: CLOSED      Yes